# Patient Record
Sex: FEMALE | Race: WHITE | NOT HISPANIC OR LATINO | Employment: OTHER | URBAN - METROPOLITAN AREA
[De-identification: names, ages, dates, MRNs, and addresses within clinical notes are randomized per-mention and may not be internally consistent; named-entity substitution may affect disease eponyms.]

---

## 2020-01-01 ENCOUNTER — APPOINTMENT (OUTPATIENT)
Dept: RADIOLOGY | Facility: MEDICAL CENTER | Age: 85
DRG: 871 | End: 2020-01-01
Attending: INTERNAL MEDICINE
Payer: MEDICARE

## 2020-01-01 ENCOUNTER — APPOINTMENT (OUTPATIENT)
Dept: RADIOLOGY | Facility: MEDICAL CENTER | Age: 85
DRG: 871 | End: 2020-01-01
Attending: EMERGENCY MEDICINE
Payer: MEDICARE

## 2020-01-01 ENCOUNTER — APPOINTMENT (OUTPATIENT)
Dept: RADIOLOGY | Facility: MEDICAL CENTER | Age: 85
DRG: 871 | End: 2020-01-01
Attending: HOSPITALIST
Payer: MEDICARE

## 2020-01-01 ENCOUNTER — HOSPICE ADMISSION (OUTPATIENT)
Dept: HOSPICE | Facility: HOSPICE | Age: 85
End: 2020-01-01
Payer: MEDICARE

## 2020-01-01 ENCOUNTER — HOSPITAL ENCOUNTER (INPATIENT)
Facility: MEDICAL CENTER | Age: 85
LOS: 1 days | DRG: 871 | End: 2020-01-30
Attending: EMERGENCY MEDICINE | Admitting: INTERNAL MEDICINE
Payer: MEDICARE

## 2020-01-01 ENCOUNTER — HOME CARE VISIT (OUTPATIENT)
Dept: HOSPICE | Facility: HOSPICE | Age: 85
End: 2020-01-01
Payer: MEDICARE

## 2020-01-01 VITALS
SYSTOLIC BLOOD PRESSURE: 62 MMHG | RESPIRATION RATE: 14 BRPM | HEART RATE: 94 BPM | DIASTOLIC BLOOD PRESSURE: 33 MMHG | OXYGEN SATURATION: 69 % | BODY MASS INDEX: 21.14 KG/M2 | TEMPERATURE: 97.1 F | HEIGHT: 62 IN | WEIGHT: 114.86 LBS

## 2020-01-01 DIAGNOSIS — R65.21 SEPTIC SHOCK (HCC): ICD-10-CM

## 2020-01-01 DIAGNOSIS — G30.9 ALZHEIMER'S DEMENTIA WITHOUT BEHAVIORAL DISTURBANCE, UNSPECIFIED TIMING OF DEMENTIA ONSET: ICD-10-CM

## 2020-01-01 DIAGNOSIS — E87.20 LACTIC ACIDOSIS: ICD-10-CM

## 2020-01-01 DIAGNOSIS — F02.80 ALZHEIMER'S DEMENTIA WITHOUT BEHAVIORAL DISTURBANCE, UNSPECIFIED TIMING OF DEMENTIA ONSET: ICD-10-CM

## 2020-01-01 DIAGNOSIS — J96.01 ACUTE RESPIRATORY FAILURE WITH HYPOXIA (HCC): ICD-10-CM

## 2020-01-01 DIAGNOSIS — N17.0 ACUTE RENAL FAILURE WITH TUBULAR NECROSIS (HCC): ICD-10-CM

## 2020-01-01 DIAGNOSIS — A41.9 SEPTIC SHOCK (HCC): ICD-10-CM

## 2020-01-01 DIAGNOSIS — G93.40 ACUTE ENCEPHALOPATHY: ICD-10-CM

## 2020-01-01 LAB
ACTION RANGE TRIGGERED IACRT: NO
ACTION RANGE TRIGGERED IACRT: NO
ACTION RANGE TRIGGERED IACRT: YES
ALBUMIN SERPL BCP-MCNC: 2.4 G/DL (ref 3.2–4.9)
ALBUMIN SERPL BCP-MCNC: 3.2 G/DL (ref 3.2–4.9)
ALBUMIN/GLOB SERPL: 0.8 G/DL
ALBUMIN/GLOB SERPL: 0.9 G/DL
ALP SERPL-CCNC: 54 U/L (ref 30–99)
ALP SERPL-CCNC: 78 U/L (ref 30–99)
ALT SERPL-CCNC: 30 U/L (ref 2–50)
ALT SERPL-CCNC: 37 U/L (ref 2–50)
ANION GAP SERPL CALC-SCNC: 11 MMOL/L (ref 0–11.9)
ANION GAP SERPL CALC-SCNC: 14 MMOL/L (ref 0–11.9)
ANION GAP SERPL CALC-SCNC: 15 MMOL/L (ref 0–11.9)
ANION GAP SERPL CALC-SCNC: 18 MMOL/L (ref 0–11.9)
ANION GAP SERPL CALC-SCNC: 19 MMOL/L (ref 0–11.9)
ANION GAP SERPL CALC-SCNC: 20 MMOL/L (ref 0–11.9)
ANION GAP SERPL CALC-SCNC: NORMAL MMOL/L (ref 0–11.9)
APPEARANCE UR: ABNORMAL
AST SERPL-CCNC: 33 U/L (ref 12–45)
AST SERPL-CCNC: 45 U/L (ref 12–45)
BACTERIA #/AREA URNS HPF: ABNORMAL /HPF
BACTERIA SPEC RESP CULT: ABNORMAL
BACTERIA SPEC RESP CULT: ABNORMAL
BASE EXCESS BLDA CALC-SCNC: -1 MMOL/L (ref -4–3)
BASE EXCESS BLDA CALC-SCNC: -12 MMOL/L (ref -4–3)
BASE EXCESS BLDA CALC-SCNC: -2 MMOL/L (ref -4–3)
BASE EXCESS BLDA CALC-SCNC: -9 MMOL/L (ref -4–3)
BASO STIPL BLD QL SMEAR: NORMAL
BASOPHILS # BLD AUTO: 0 % (ref 0–1.8)
BASOPHILS # BLD AUTO: 0.8 % (ref 0–1.8)
BASOPHILS # BLD AUTO: 1 % (ref 0–1.8)
BASOPHILS # BLD: 0 K/UL (ref 0–0.12)
BASOPHILS # BLD: 0.18 K/UL (ref 0–0.12)
BASOPHILS # BLD: 0.21 K/UL (ref 0–0.12)
BILIRUB SERPL-MCNC: 0.3 MG/DL (ref 0.1–1.5)
BILIRUB SERPL-MCNC: 0.5 MG/DL (ref 0.1–1.5)
BILIRUB UR QL STRIP.AUTO: NEGATIVE
BODY TEMPERATURE: ABNORMAL CENTIGRADE
BODY TEMPERATURE: ABNORMAL DEGREES
BODY TEMPERATURE: ABNORMAL DEGREES
BODY TEMPERATURE: NORMAL DEGREES
BUN SERPL-MCNC: 32 MG/DL (ref 8–22)
BUN SERPL-MCNC: 48 MG/DL (ref 8–22)
BUN SERPL-MCNC: 53 MG/DL (ref 8–22)
BUN SERPL-MCNC: 56 MG/DL (ref 8–22)
BUN SERPL-MCNC: 71 MG/DL (ref 8–22)
BUN SERPL-MCNC: 74 MG/DL (ref 8–22)
BUN SERPL-MCNC: NORMAL MG/DL (ref 8–22)
CALCIUM SERPL-MCNC: 7.5 MG/DL (ref 8.4–10.2)
CALCIUM SERPL-MCNC: 7.7 MG/DL (ref 8.4–10.2)
CALCIUM SERPL-MCNC: 8 MG/DL (ref 8.4–10.2)
CALCIUM SERPL-MCNC: 8 MG/DL (ref 8.4–10.2)
CALCIUM SERPL-MCNC: 8.2 MG/DL (ref 8.4–10.2)
CALCIUM SERPL-MCNC: 8.2 MG/DL (ref 8.4–10.2)
CALCIUM SERPL-MCNC: NORMAL MG/DL (ref 8.4–10.2)
CHLORIDE SERPL-SCNC: 106 MMOL/L (ref 96–112)
CHLORIDE SERPL-SCNC: 106 MMOL/L (ref 96–112)
CHLORIDE SERPL-SCNC: 110 MMOL/L (ref 96–112)
CHLORIDE SERPL-SCNC: 114 MMOL/L (ref 96–112)
CHLORIDE SERPL-SCNC: 116 MMOL/L (ref 96–112)
CHLORIDE SERPL-SCNC: 116 MMOL/L (ref 96–112)
CHLORIDE SERPL-SCNC: NORMAL MMOL/L (ref 96–112)
CO2 BLDA-SCNC: 15 MMOL/L (ref 20–33)
CO2 BLDA-SCNC: 22 MMOL/L (ref 20–33)
CO2 BLDA-SCNC: 23 MMOL/L (ref 20–33)
CO2 SERPL-SCNC: 18 MMOL/L (ref 20–33)
CO2 SERPL-SCNC: 18 MMOL/L (ref 20–33)
CO2 SERPL-SCNC: 19 MMOL/L (ref 20–33)
CO2 SERPL-SCNC: 19 MMOL/L (ref 20–33)
CO2 SERPL-SCNC: 20 MMOL/L (ref 20–33)
CO2 SERPL-SCNC: 24 MMOL/L (ref 20–33)
CO2 SERPL-SCNC: NORMAL MMOL/L (ref 20–33)
COLOR UR: YELLOW
CREAT SERPL-MCNC: 0.92 MG/DL (ref 0.5–1.4)
CREAT SERPL-MCNC: 1.15 MG/DL (ref 0.5–1.4)
CREAT SERPL-MCNC: 1.3 MG/DL (ref 0.5–1.4)
CREAT SERPL-MCNC: 1.48 MG/DL (ref 0.5–1.4)
CREAT SERPL-MCNC: 1.95 MG/DL (ref 0.5–1.4)
CREAT SERPL-MCNC: 2.24 MG/DL (ref 0.5–1.4)
CREAT SERPL-MCNC: NORMAL MG/DL (ref 0.5–1.4)
EKG IMPRESSION: NORMAL
EOSINOPHIL # BLD AUTO: 0 K/UL (ref 0–0.51)
EOSINOPHIL # BLD AUTO: 0 K/UL (ref 0–0.51)
EOSINOPHIL # BLD AUTO: 0.01 K/UL (ref 0–0.51)
EOSINOPHIL NFR BLD: 0 % (ref 0–6.9)
ERYTHROCYTE [DISTWIDTH] IN BLOOD BY AUTOMATED COUNT: 37.2 FL (ref 35.9–50)
ERYTHROCYTE [DISTWIDTH] IN BLOOD BY AUTOMATED COUNT: 37.5 FL (ref 35.9–50)
ERYTHROCYTE [DISTWIDTH] IN BLOOD BY AUTOMATED COUNT: 38.7 FL (ref 35.9–50)
GLOBULIN SER CALC-MCNC: 3.1 G/DL (ref 1.9–3.5)
GLOBULIN SER CALC-MCNC: 3.4 G/DL (ref 1.9–3.5)
GLUCOSE BLD-MCNC: 105 MG/DL (ref 65–99)
GLUCOSE BLD-MCNC: 125 MG/DL (ref 65–99)
GLUCOSE BLD-MCNC: 129 MG/DL (ref 65–99)
GLUCOSE BLD-MCNC: 142 MG/DL (ref 65–99)
GLUCOSE BLD-MCNC: 164 MG/DL (ref 65–99)
GLUCOSE BLD-MCNC: 165 MG/DL (ref 65–99)
GLUCOSE BLD-MCNC: 171 MG/DL (ref 65–99)
GLUCOSE BLD-MCNC: 175 MG/DL (ref 65–99)
GLUCOSE BLD-MCNC: 175 MG/DL (ref 65–99)
GLUCOSE BLD-MCNC: 181 MG/DL (ref 65–99)
GLUCOSE BLD-MCNC: 183 MG/DL (ref 65–99)
GLUCOSE BLD-MCNC: 188 MG/DL (ref 65–99)
GLUCOSE BLD-MCNC: 192 MG/DL (ref 65–99)
GLUCOSE BLD-MCNC: 194 MG/DL (ref 65–99)
GLUCOSE BLD-MCNC: 197 MG/DL (ref 65–99)
GLUCOSE BLD-MCNC: 223 MG/DL (ref 65–99)
GLUCOSE BLD-MCNC: 230 MG/DL (ref 65–99)
GLUCOSE BLD-MCNC: 242 MG/DL (ref 65–99)
GLUCOSE BLD-MCNC: 250 MG/DL (ref 65–99)
GLUCOSE BLD-MCNC: 280 MG/DL (ref 65–99)
GLUCOSE BLD-MCNC: 330 MG/DL (ref 65–99)
GLUCOSE SERPL-MCNC: 150 MG/DL (ref 65–99)
GLUCOSE SERPL-MCNC: 212 MG/DL (ref 65–99)
GLUCOSE SERPL-MCNC: 225 MG/DL (ref 65–99)
GLUCOSE SERPL-MCNC: 336 MG/DL (ref 65–99)
GLUCOSE SERPL-MCNC: 636 MG/DL (ref 65–99)
GLUCOSE SERPL-MCNC: 878 MG/DL (ref 65–99)
GLUCOSE SERPL-MCNC: 893 MG/DL (ref 65–99)
GLUCOSE SERPL-MCNC: 906 MG/DL (ref 65–99)
GLUCOSE SERPL-MCNC: NORMAL MG/DL (ref 65–99)
GLUCOSE UR STRIP.AUTO-MCNC: 500 MG/DL
GRAM STN SPEC: ABNORMAL
GRAM STN SPEC: NORMAL
HCO3 BLDA-SCNC: 14.1 MMOL/L (ref 17–25)
HCO3 BLDA-SCNC: 18 MMOL/L (ref 17–25)
HCO3 BLDA-SCNC: 21.4 MMOL/L (ref 17–25)
HCO3 BLDA-SCNC: 22.1 MMOL/L (ref 17–25)
HCT VFR BLD AUTO: 24.7 % (ref 37–47)
HCT VFR BLD AUTO: 26.1 % (ref 37–47)
HCT VFR BLD AUTO: 33.9 % (ref 37–47)
HGB BLD-MCNC: 10.3 G/DL (ref 12–16)
HGB BLD-MCNC: 7.6 G/DL (ref 12–16)
HGB BLD-MCNC: 8 G/DL (ref 12–16)
HOROWITZ INDEX BLDA+IHG-RTO: 122 MM[HG]
HOROWITZ INDEX BLDA+IHG-RTO: 145 MM[HG]
HOROWITZ INDEX BLDA+IHG-RTO: 210 MM[HG]
HYPOCHROMIA BLD QL SMEAR: ABNORMAL
HYPOCHROMIA BLD QL SMEAR: ABNORMAL
IMM GRANULOCYTES # BLD AUTO: 0.15 K/UL (ref 0–0.11)
IMM GRANULOCYTES NFR BLD AUTO: 0.7 % (ref 0–0.9)
INST. QUALIFIED PATIENT IIQPT: YES
KETONES UR STRIP.AUTO-MCNC: ABNORMAL MG/DL
LACTATE BLD-SCNC: 1.9 MMOL/L (ref 0.5–2)
LACTATE BLD-SCNC: 10.8 MMOL/L (ref 0.5–2)
LACTATE BLD-SCNC: 4.2 MMOL/L (ref 0.5–2)
LACTATE BLD-SCNC: 4.4 MMOL/L (ref 0.5–2)
LACTATE BLD-SCNC: 4.6 MMOL/L (ref 0.5–2)
LACTATE BLD-SCNC: 5.1 MMOL/L (ref 0.5–2)
LACTATE BLD-SCNC: 6.5 MMOL/L (ref 0.5–2)
LACTATE BLD-SCNC: NORMAL MMOL/L (ref 0.5–2)
LEUKOCYTE ESTERASE UR QL STRIP.AUTO: ABNORMAL
LYMPHOCYTES # BLD AUTO: 1.29 K/UL (ref 1–4.8)
LYMPHOCYTES # BLD AUTO: 1.86 K/UL (ref 1–4.8)
LYMPHOCYTES # BLD AUTO: 3.15 K/UL (ref 1–4.8)
LYMPHOCYTES NFR BLD: 15 % (ref 22–41)
LYMPHOCYTES NFR BLD: 5 % (ref 22–41)
LYMPHOCYTES NFR BLD: 8.4 % (ref 22–41)
MAGNESIUM SERPL-MCNC: 1.8 MG/DL (ref 1.5–2.5)
MAGNESIUM SERPL-MCNC: 2.2 MG/DL (ref 1.5–2.5)
MANUAL DIFF BLD: ABNORMAL
MANUAL DIFF BLD: ABNORMAL
MCH RBC QN AUTO: 20.2 PG (ref 27–33)
MCH RBC QN AUTO: 20.2 PG (ref 27–33)
MCH RBC QN AUTO: 20.3 PG (ref 27–33)
MCHC RBC AUTO-ENTMCNC: 30.4 G/DL (ref 33.6–35)
MCHC RBC AUTO-ENTMCNC: 30.7 G/DL (ref 33.6–35)
MCHC RBC AUTO-ENTMCNC: 30.8 G/DL (ref 33.6–35)
MCV RBC AUTO: 65.7 FL (ref 81.4–97.8)
MCV RBC AUTO: 65.9 FL (ref 81.4–97.8)
MCV RBC AUTO: 66.3 FL (ref 81.4–97.8)
METAMYELOCYTES NFR BLD MANUAL: 1 %
METAMYELOCYTES NFR BLD MANUAL: 8 %
MICRO URNS: ABNORMAL
MICROCYTES BLD QL SMEAR: ABNORMAL
MICROCYTES BLD QL SMEAR: ABNORMAL
MONOCYTES # BLD AUTO: 1.03 K/UL (ref 0–0.85)
MONOCYTES # BLD AUTO: 1.44 K/UL (ref 0–0.85)
MONOCYTES # BLD AUTO: 1.68 K/UL (ref 0–0.85)
MONOCYTES NFR BLD AUTO: 4 % (ref 0–13.4)
MONOCYTES NFR BLD AUTO: 6.5 % (ref 0–13.4)
MONOCYTES NFR BLD AUTO: 8 % (ref 0–13.4)
MRSA DNA SPEC QL NAA+PROBE: NORMAL
NEUTROPHILS # BLD AUTO: 14.28 K/UL (ref 2–7.15)
NEUTROPHILS # BLD AUTO: 18.63 K/UL (ref 2–7.15)
NEUTROPHILS # BLD AUTO: 23.22 K/UL (ref 2–7.15)
NEUTROPHILS NFR BLD: 20 % (ref 44–72)
NEUTROPHILS NFR BLD: 57 % (ref 44–72)
NEUTROPHILS NFR BLD: 83.6 % (ref 44–72)
NEUTS BAND NFR BLD MANUAL: 33 % (ref 0–10)
NEUTS BAND NFR BLD MANUAL: 48 % (ref 0–10)
NITRITE UR QL STRIP.AUTO: NEGATIVE
NRBC # BLD AUTO: 0.03 K/UL
NRBC # BLD AUTO: 0.04 K/UL
NRBC # BLD AUTO: 0.05 K/UL
NRBC BLD-RTO: 0.1 /100 WBC
NRBC BLD-RTO: 0.2 /100 WBC
NRBC BLD-RTO: 0.2 /100 WBC
NT-PROBNP SERPL IA-MCNC: 1199 PG/ML (ref 0–125)
O2/TOTAL GAS SETTING VFR VENT: 40 %
O2/TOTAL GAS SETTING VFR VENT: 60 %
O2/TOTAL GAS SETTING VFR VENT: 60 %
OSMOLALITY SERPL: 327 MOSM/KG H2O (ref 278–298)
OSMOLALITY SERPL: 341 MOSM/KG H2O (ref 278–298)
PCO2 BLDA: 24.8 MMHG (ref 26–37)
PCO2 BLDA: 30.4 MMHG (ref 26–37)
PCO2 BLDA: 34.3 MMHG (ref 26–37)
PCO2 BLDA: 41.9 MMHG (ref 26–37)
PH BLDA: 7.24 [PH] (ref 7.4–7.5)
PH BLDA: 7.27 [PH] (ref 7.4–7.5)
PH BLDA: 7.42 [PH] (ref 7.4–7.5)
PH BLDA: 7.54 [PH] (ref 7.4–7.5)
PH TEMP ADJ BLDA: 7.26 [PH] (ref 7.4–7.5)
PH TEMP ADJ BLDA: 7.42 [PH] (ref 7.4–7.5)
PH TEMP ADJ BLDA: 7.54 [PH] (ref 7.4–7.5)
PH UR STRIP.AUTO: 5 [PH] (ref 5–8)
PHOSPHATE SERPL-MCNC: 0.8 MG/DL (ref 2.5–4.5)
PHOSPHATE SERPL-MCNC: 3.5 MG/DL (ref 2.5–4.5)
PLATELET # BLD AUTO: 238 K/UL (ref 164–446)
PLATELET # BLD AUTO: 255 K/UL (ref 164–446)
PLATELET # BLD AUTO: 447 K/UL (ref 164–446)
PLATELET BLD QL SMEAR: NORMAL
PLATELET BLD QL SMEAR: NORMAL
PMV BLD AUTO: 12.2 FL (ref 9–12.9)
PMV BLD AUTO: 13 FL (ref 9–12.9)
PO2 BLDA: 73 MMHG (ref 64–87)
PO2 BLDA: 84 MMHG (ref 64–87)
PO2 BLDA: 87 MMHG (ref 64–87)
PO2 BLDA: 87.9 MMHG (ref 64–87)
PO2 TEMP ADJ BLDA: 76 MMHG (ref 64–87)
PO2 TEMP ADJ BLDA: 81 MMHG (ref 64–87)
PO2 TEMP ADJ BLDA: 88 MMHG (ref 64–87)
POLYCHROMASIA BLD QL SMEAR: NORMAL
POLYCHROMASIA BLD QL SMEAR: NORMAL
POTASSIUM SERPL-SCNC: 3.6 MMOL/L (ref 3.6–5.5)
POTASSIUM SERPL-SCNC: 3.7 MMOL/L (ref 3.6–5.5)
POTASSIUM SERPL-SCNC: 4 MMOL/L (ref 3.6–5.5)
POTASSIUM SERPL-SCNC: 4.1 MMOL/L (ref 3.6–5.5)
POTASSIUM SERPL-SCNC: 5.7 MMOL/L (ref 3.6–5.5)
POTASSIUM SERPL-SCNC: 5.8 MMOL/L (ref 3.6–5.5)
POTASSIUM SERPL-SCNC: NORMAL MMOL/L (ref 3.6–5.5)
PROCALCITONIN SERPL-MCNC: 25.5 NG/ML
PROT SERPL-MCNC: 5.5 G/DL (ref 6–8.2)
PROT SERPL-MCNC: 6.6 G/DL (ref 6–8.2)
PROT UR QL STRIP: NEGATIVE MG/DL
RBC # BLD AUTO: 3.76 M/UL (ref 4.2–5.4)
RBC # BLD AUTO: 3.9 M/UL (ref 4.2–5.4)
RBC # BLD AUTO: 5.11 M/UL (ref 4.2–5.4)
RBC # URNS HPF: ABNORMAL /HPF
RBC BLD AUTO: PRESENT
RBC BLD AUTO: PRESENT
RBC UR QL AUTO: ABNORMAL
SAO2 % BLDA: 92 % (ref 93–99)
SAO2 % BLDA: 93.3 % (ref 93–99)
SAO2 % BLDA: 97 % (ref 93–99)
SAO2 % BLDA: 98 % (ref 93–99)
SIGNIFICANT IND 70042: ABNORMAL
SIGNIFICANT IND 70042: NORMAL
SIGNIFICANT IND 70042: NORMAL
SITE SITE: ABNORMAL
SITE SITE: NORMAL
SITE SITE: NORMAL
SODIUM SERPL-SCNC: 144 MMOL/L (ref 135–145)
SODIUM SERPL-SCNC: 145 MMOL/L (ref 135–145)
SODIUM SERPL-SCNC: 145 MMOL/L (ref 135–145)
SODIUM SERPL-SCNC: 147 MMOL/L (ref 135–145)
SODIUM SERPL-SCNC: 150 MMOL/L (ref 135–145)
SODIUM SERPL-SCNC: 152 MMOL/L (ref 135–145)
SODIUM SERPL-SCNC: NORMAL MMOL/L (ref 135–145)
SOURCE SOURCE: ABNORMAL
SOURCE SOURCE: NORMAL
SOURCE SOURCE: NORMAL
SP GR UR REFRACTOMETRY: 1.02
SPECIMEN DRAWN FROM PATIENT: ABNORMAL
SPECIMEN DRAWN FROM PATIENT: ABNORMAL
SPECIMEN DRAWN FROM PATIENT: NORMAL
TROPONIN T SERPL-MCNC: 86 NG/L (ref 6–19)
VANCOMYCIN TIMED 2584: 7.4 UG/ML
WBC # BLD AUTO: 21 K/UL (ref 4.8–10.8)
WBC # BLD AUTO: 22.3 K/UL (ref 4.8–10.8)
WBC # BLD AUTO: 25.8 K/UL (ref 4.8–10.8)
WBC #/AREA URNS HPF: ABNORMAL /HPF

## 2020-01-01 PROCEDURE — 82962 GLUCOSE BLOOD TEST: CPT | Mod: 91

## 2020-01-01 PROCEDURE — 02HV33Z INSERTION OF INFUSION DEVICE INTO SUPERIOR VENA CAVA, PERCUTANEOUS APPROACH: ICD-10-PCS | Performed by: INTERNAL MEDICINE

## 2020-01-01 PROCEDURE — 700111 HCHG RX REV CODE 636 W/ 250 OVERRIDE (IP): Performed by: INTERNAL MEDICINE

## 2020-01-01 PROCEDURE — 99291 CRITICAL CARE FIRST HOUR: CPT

## 2020-01-01 PROCEDURE — 80048 BASIC METABOLIC PNL TOTAL CA: CPT

## 2020-01-01 PROCEDURE — 36592 COLLECT BLOOD FROM PICC: CPT

## 2020-01-01 PROCEDURE — 700102 HCHG RX REV CODE 250 W/ 637 OVERRIDE(OP)

## 2020-01-01 PROCEDURE — 700105 HCHG RX REV CODE 258: Performed by: INTERNAL MEDICINE

## 2020-01-01 PROCEDURE — 96368 THER/DIAG CONCURRENT INF: CPT

## 2020-01-01 PROCEDURE — 36556 INSERT NON-TUNNEL CV CATH: CPT | Mod: RT | Performed by: INTERNAL MEDICINE

## 2020-01-01 PROCEDURE — 85027 COMPLETE CBC AUTOMATED: CPT

## 2020-01-01 PROCEDURE — 82803 BLOOD GASES ANY COMBINATION: CPT | Mod: 91

## 2020-01-01 PROCEDURE — 700101 HCHG RX REV CODE 250

## 2020-01-01 PROCEDURE — 700111 HCHG RX REV CODE 636 W/ 250 OVERRIDE (IP)

## 2020-01-01 PROCEDURE — 94760 N-INVAS EAR/PLS OXIMETRY 1: CPT

## 2020-01-01 PROCEDURE — 84100 ASSAY OF PHOSPHORUS: CPT

## 2020-01-01 PROCEDURE — 87147 CULTURE TYPE IMMUNOLOGIC: CPT

## 2020-01-01 PROCEDURE — 96366 THER/PROPH/DIAG IV INF ADDON: CPT

## 2020-01-01 PROCEDURE — G0378 HOSPITAL OBSERVATION PER HR: HCPCS

## 2020-01-01 PROCEDURE — 31500 INSERT EMERGENCY AIRWAY: CPT

## 2020-01-01 PROCEDURE — 700105 HCHG RX REV CODE 258: Performed by: EMERGENCY MEDICINE

## 2020-01-01 PROCEDURE — 700101 HCHG RX REV CODE 250: Performed by: INTERNAL MEDICINE

## 2020-01-01 PROCEDURE — 71045 X-RAY EXAM CHEST 1 VIEW: CPT

## 2020-01-01 PROCEDURE — 36600 WITHDRAWAL OF ARTERIAL BLOOD: CPT

## 2020-01-01 PROCEDURE — 770022 HCHG ROOM/CARE - ICU (200)

## 2020-01-01 PROCEDURE — 99291 CRITICAL CARE FIRST HOUR: CPT | Performed by: INTERNAL MEDICINE

## 2020-01-01 PROCEDURE — 700102 HCHG RX REV CODE 250 W/ 637 OVERRIDE(OP): Performed by: INTERNAL MEDICINE

## 2020-01-01 PROCEDURE — 96372 THER/PROPH/DIAG INJ SC/IM: CPT

## 2020-01-01 PROCEDURE — E0191 PROTECTOR HEEL OR ELBOW: HCPCS | Performed by: INTERNAL MEDICINE

## 2020-01-01 PROCEDURE — 96365 THER/PROPH/DIAG IV INF INIT: CPT

## 2020-01-01 PROCEDURE — 85007 BL SMEAR W/DIFF WBC COUNT: CPT

## 2020-01-01 PROCEDURE — 82803 BLOOD GASES ANY COMBINATION: CPT

## 2020-01-01 PROCEDURE — 87040 BLOOD CULTURE FOR BACTERIA: CPT

## 2020-01-01 PROCEDURE — 700111 HCHG RX REV CODE 636 W/ 250 OVERRIDE (IP): Performed by: EMERGENCY MEDICINE

## 2020-01-01 PROCEDURE — 80053 COMPREHEN METABOLIC PANEL: CPT

## 2020-01-01 PROCEDURE — 82947 ASSAY GLUCOSE BLOOD QUANT: CPT

## 2020-01-01 PROCEDURE — 93005 ELECTROCARDIOGRAM TRACING: CPT | Performed by: EMERGENCY MEDICINE

## 2020-01-01 PROCEDURE — 99292 CRITICAL CARE ADDL 30 MIN: CPT | Mod: 25 | Performed by: INTERNAL MEDICINE

## 2020-01-01 PROCEDURE — 87205 SMEAR GRAM STAIN: CPT

## 2020-01-01 PROCEDURE — 83930 ASSAY OF BLOOD OSMOLALITY: CPT

## 2020-01-01 PROCEDURE — 80202 ASSAY OF VANCOMYCIN: CPT

## 2020-01-01 PROCEDURE — 96376 TX/PRO/DX INJ SAME DRUG ADON: CPT

## 2020-01-01 PROCEDURE — 99291 CRITICAL CARE FIRST HOUR: CPT | Mod: 25 | Performed by: INTERNAL MEDICINE

## 2020-01-01 PROCEDURE — 87641 MR-STAPH DNA AMP PROBE: CPT

## 2020-01-01 PROCEDURE — 36556 INSERT NON-TUNNEL CV CATH: CPT

## 2020-01-01 PROCEDURE — 84484 ASSAY OF TROPONIN QUANT: CPT

## 2020-01-01 PROCEDURE — 82962 GLUCOSE BLOOD TEST: CPT

## 2020-01-01 PROCEDURE — 85025 COMPLETE CBC W/AUTO DIFF WBC: CPT

## 2020-01-01 PROCEDURE — 94003 VENT MGMT INPAT SUBQ DAY: CPT

## 2020-01-01 PROCEDURE — 83605 ASSAY OF LACTIC ACID: CPT | Mod: 91

## 2020-01-01 PROCEDURE — A9270 NON-COVERED ITEM OR SERVICE: HCPCS | Performed by: INTERNAL MEDICINE

## 2020-01-01 PROCEDURE — 87077 CULTURE AEROBIC IDENTIFY: CPT

## 2020-01-01 PROCEDURE — 84145 PROCALCITONIN (PCT): CPT

## 2020-01-01 PROCEDURE — 700105 HCHG RX REV CODE 258: Performed by: HOSPITALIST

## 2020-01-01 PROCEDURE — 81001 URINALYSIS AUTO W/SCOPE: CPT

## 2020-01-01 PROCEDURE — 700101 HCHG RX REV CODE 250: Performed by: EMERGENCY MEDICINE

## 2020-01-01 PROCEDURE — 96375 TX/PRO/DX INJ NEW DRUG ADDON: CPT

## 2020-01-01 PROCEDURE — 83880 ASSAY OF NATRIURETIC PEPTIDE: CPT

## 2020-01-01 PROCEDURE — 94002 VENT MGMT INPAT INIT DAY: CPT

## 2020-01-01 PROCEDURE — 87186 SC STD MICRODIL/AGAR DIL: CPT

## 2020-01-01 PROCEDURE — 83735 ASSAY OF MAGNESIUM: CPT

## 2020-01-01 PROCEDURE — 94770 HCHG CO2 EXPIRED GAS DETERMINATION: CPT

## 2020-01-01 PROCEDURE — 87070 CULTURE OTHR SPECIMN AEROBIC: CPT

## 2020-01-01 PROCEDURE — B548ZZA ULTRASONOGRAPHY OF SUPERIOR VENA CAVA, GUIDANCE: ICD-10-PCS | Performed by: INTERNAL MEDICINE

## 2020-01-01 RX ORDER — QUETIAPINE FUMARATE 25 MG/1
25 TABLET, FILM COATED ORAL 2 TIMES DAILY
Status: DISCONTINUED | OUTPATIENT
Start: 2020-01-01 | End: 2020-01-01

## 2020-01-01 RX ORDER — AZITHROMYCIN 250 MG/1
500 TABLET, FILM COATED ORAL DAILY
Status: DISCONTINUED | OUTPATIENT
Start: 2020-01-01 | End: 2020-01-01

## 2020-01-01 RX ORDER — POLYETHYLENE GLYCOL 3350 17 G/17G
1 POWDER, FOR SOLUTION ORAL
Status: DISCONTINUED | OUTPATIENT
Start: 2020-01-01 | End: 2020-01-01

## 2020-01-01 RX ORDER — MIDAZOLAM HYDROCHLORIDE 1 MG/ML
1-5 INJECTION INTRAMUSCULAR; INTRAVENOUS
Status: DISCONTINUED | OUTPATIENT
Start: 2020-01-01 | End: 2020-01-01

## 2020-01-01 RX ORDER — CLONAZEPAM 0.5 MG/1
0.5 TABLET ORAL 2 TIMES DAILY
Status: SHIPPED | COMMUNITY
End: 2020-01-01

## 2020-01-01 RX ORDER — BISACODYL 10 MG
10 SUPPOSITORY, RECTAL RECTAL
Status: DISCONTINUED | OUTPATIENT
Start: 2020-01-01 | End: 2020-01-01

## 2020-01-01 RX ORDER — FAMOTIDINE 20 MG/1
20 TABLET, FILM COATED ORAL EVERY 12 HOURS
Status: DISCONTINUED | OUTPATIENT
Start: 2020-01-01 | End: 2020-01-01

## 2020-01-01 RX ORDER — SODIUM CHLORIDE, SODIUM LACTATE, POTASSIUM CHLORIDE, CALCIUM CHLORIDE 600; 310; 30; 20 MG/100ML; MG/100ML; MG/100ML; MG/100ML
1000 INJECTION, SOLUTION INTRAVENOUS ONCE
Status: COMPLETED | OUTPATIENT
Start: 2020-01-01 | End: 2020-01-01

## 2020-01-01 RX ORDER — ACETAMINOPHEN 325 MG/1
650 TABLET ORAL EVERY 6 HOURS PRN
Status: DISCONTINUED | OUTPATIENT
Start: 2020-01-01 | End: 2020-01-01 | Stop reason: HOSPADM

## 2020-01-01 RX ORDER — FAMOTIDINE 20 MG/1
20 TABLET, FILM COATED ORAL DAILY
Status: DISCONTINUED | OUTPATIENT
Start: 2020-01-01 | End: 2020-01-01

## 2020-01-01 RX ORDER — SODIUM CHLORIDE 9 MG/ML
INJECTION, SOLUTION INTRAVENOUS CONTINUOUS
Status: DISCONTINUED | OUTPATIENT
Start: 2020-01-01 | End: 2020-01-01

## 2020-01-01 RX ORDER — LEVOTHYROXINE SODIUM 112 UG/1
112 TABLET ORAL
Status: DISCONTINUED | OUTPATIENT
Start: 2020-01-01 | End: 2020-01-01

## 2020-01-01 RX ORDER — SODIUM CHLORIDE, SODIUM LACTATE, POTASSIUM CHLORIDE, CALCIUM CHLORIDE 600; 310; 30; 20 MG/100ML; MG/100ML; MG/100ML; MG/100ML
INJECTION, SOLUTION INTRAVENOUS CONTINUOUS
Status: DISCONTINUED | OUTPATIENT
Start: 2020-01-01 | End: 2020-01-01

## 2020-01-01 RX ORDER — AMOXICILLIN 250 MG
2 CAPSULE ORAL 2 TIMES DAILY
Status: DISCONTINUED | OUTPATIENT
Start: 2020-01-01 | End: 2020-01-01

## 2020-01-01 RX ORDER — LEVOTHYROXINE SODIUM 112 UG/1
112 TABLET ORAL
COMMUNITY

## 2020-01-01 RX ORDER — CHOLECALCIFEROL (VITAMIN D3) 125 MCG
500 CAPSULE ORAL DAILY
COMMUNITY

## 2020-01-01 RX ORDER — SODIUM CHLORIDE, SODIUM LACTATE, POTASSIUM CHLORIDE, CALCIUM CHLORIDE 600; 310; 30; 20 MG/100ML; MG/100ML; MG/100ML; MG/100ML
2000 INJECTION, SOLUTION INTRAVENOUS ONCE
Status: COMPLETED | OUTPATIENT
Start: 2020-01-01 | End: 2020-01-01

## 2020-01-01 RX ORDER — POTASSIUM CHLORIDE 14.9 MG/ML
20 INJECTION INTRAVENOUS ONCE
Status: COMPLETED | OUTPATIENT
Start: 2020-01-01 | End: 2020-01-01

## 2020-01-01 RX ORDER — MAGNESIUM SULFATE HEPTAHYDRATE 40 MG/ML
2 INJECTION, SOLUTION INTRAVENOUS ONCE
Status: COMPLETED | OUTPATIENT
Start: 2020-01-01 | End: 2020-01-01

## 2020-01-01 RX ORDER — ATROPINE SULFATE 10 MG/ML
2 SOLUTION/ DROPS OPHTHALMIC EVERY 4 HOURS PRN
Status: DISCONTINUED | OUTPATIENT
Start: 2020-01-01 | End: 2020-01-01 | Stop reason: HOSPADM

## 2020-01-01 RX ORDER — LORAZEPAM 2 MG/ML
1-2 INJECTION INTRAMUSCULAR
Status: DISCONTINUED | OUTPATIENT
Start: 2020-01-01 | End: 2020-01-01 | Stop reason: HOSPADM

## 2020-01-01 RX ORDER — HALOPERIDOL 5 MG/ML
5 INJECTION INTRAMUSCULAR EVERY 4 HOURS PRN
Status: DISCONTINUED | OUTPATIENT
Start: 2020-01-01 | End: 2020-01-01 | Stop reason: HOSPADM

## 2020-01-01 RX ORDER — HYDROCODONE BITARTRATE AND ACETAMINOPHEN 5; 325 MG/1; MG/1
1 TABLET ORAL
COMMUNITY

## 2020-01-01 RX ORDER — METRONIDAZOLE 500 MG/1
500 TABLET ORAL EVERY 8 HOURS
Status: DISCONTINUED | OUTPATIENT
Start: 2020-01-01 | End: 2020-01-01

## 2020-01-01 RX ORDER — ACETAMINOPHEN 650 MG/1
650 SUPPOSITORY RECTAL EVERY 4 HOURS PRN
Status: DISCONTINUED | OUTPATIENT
Start: 2020-01-01 | End: 2020-01-01 | Stop reason: HOSPADM

## 2020-01-01 RX ORDER — VASOPRESSIN 20 U/ML
INJECTION PARENTERAL
Status: COMPLETED
Start: 2020-01-01 | End: 2020-01-01

## 2020-01-01 RX ORDER — POLYETHYLENE GLYCOL 3350 17 G/17G
1 POWDER, FOR SOLUTION ORAL
Status: DISCONTINUED | OUTPATIENT
Start: 2020-01-01 | End: 2020-01-01 | Stop reason: HOSPADM

## 2020-01-01 RX ORDER — LINEZOLID 600 MG/1
600 TABLET, FILM COATED ORAL EVERY 12 HOURS
Status: DISCONTINUED | OUTPATIENT
Start: 2020-01-01 | End: 2020-01-01

## 2020-01-01 RX ORDER — QUETIAPINE FUMARATE 100 MG/1
100 TABLET, FILM COATED ORAL DAILY
Status: SHIPPED | COMMUNITY
Start: 2020-01-01 | End: 2020-01-01

## 2020-01-01 RX ORDER — CEFAZOLIN SODIUM 1 G/3ML
INJECTION, POWDER, FOR SOLUTION INTRAMUSCULAR; INTRAVENOUS
Status: COMPLETED
Start: 2020-01-01 | End: 2020-01-01

## 2020-01-01 RX ORDER — KETAMINE HYDROCHLORIDE 50 MG/ML
INJECTION, SOLUTION INTRAMUSCULAR; INTRAVENOUS
Status: COMPLETED
Start: 2020-01-01 | End: 2020-01-01

## 2020-01-01 RX ORDER — IPRATROPIUM BROMIDE AND ALBUTEROL SULFATE 2.5; .5 MG/3ML; MG/3ML
3 SOLUTION RESPIRATORY (INHALATION)
Status: DISCONTINUED | OUTPATIENT
Start: 2020-01-01 | End: 2020-01-01 | Stop reason: HOSPADM

## 2020-01-01 RX ORDER — SODIUM CHLORIDE, SODIUM LACTATE, POTASSIUM CHLORIDE, AND CALCIUM CHLORIDE .6; .31; .03; .02 G/100ML; G/100ML; G/100ML; G/100ML
500 INJECTION, SOLUTION INTRAVENOUS
Status: DISCONTINUED | OUTPATIENT
Start: 2020-01-01 | End: 2020-01-01 | Stop reason: HOSPADM

## 2020-01-01 RX ORDER — SODIUM CHLORIDE 9 MG/ML
1000 INJECTION, SOLUTION INTRAVENOUS ONCE
Status: COMPLETED | OUTPATIENT
Start: 2020-01-01 | End: 2020-01-01

## 2020-01-01 RX ORDER — DIVALPROEX SODIUM 125 MG/1
125 CAPSULE, COATED PELLETS ORAL 3 TIMES DAILY
COMMUNITY

## 2020-01-01 RX ORDER — BISACODYL 10 MG
10 SUPPOSITORY, RECTAL RECTAL
Status: DISCONTINUED | OUTPATIENT
Start: 2020-01-01 | End: 2020-01-01 | Stop reason: HOSPADM

## 2020-01-01 RX ORDER — DIVALPROEX SODIUM 125 MG/1
125 CAPSULE, COATED PELLETS ORAL 3 TIMES DAILY
Status: DISCONTINUED | OUTPATIENT
Start: 2020-01-01 | End: 2020-01-01

## 2020-01-01 RX ORDER — SODIUM CHLORIDE 450 MG/100ML
INJECTION, SOLUTION INTRAVENOUS CONTINUOUS
Status: DISCONTINUED | OUTPATIENT
Start: 2020-01-01 | End: 2020-01-01

## 2020-01-01 RX ORDER — SODIUM CHLORIDE, SODIUM LACTATE, POTASSIUM CHLORIDE, AND CALCIUM CHLORIDE .6; .31; .03; .02 G/100ML; G/100ML; G/100ML; G/100ML
30 INJECTION, SOLUTION INTRAVENOUS
Status: DISCONTINUED | OUTPATIENT
Start: 2020-01-01 | End: 2020-01-01 | Stop reason: HOSPADM

## 2020-01-01 RX ORDER — INSULIN GLARGINE 100 [IU]/ML
20 INJECTION, SOLUTION SUBCUTANEOUS
Status: DISCONTINUED | OUTPATIENT
Start: 2020-01-01 | End: 2020-01-01

## 2020-01-01 RX ADMIN — POLYETHYLENE GLYCOL (3350) 1 PACKET: 17 POWDER, FOR SOLUTION ORAL at 05:41

## 2020-01-01 RX ADMIN — ENOXAPARIN SODIUM 40 MG: 100 INJECTION SUBCUTANEOUS at 17:47

## 2020-01-01 RX ADMIN — SODIUM CHLORIDE, POTASSIUM CHLORIDE, SODIUM LACTATE AND CALCIUM CHLORIDE 1000 ML: 600; 310; 30; 20 INJECTION, SOLUTION INTRAVENOUS at 14:45

## 2020-01-01 RX ADMIN — DIVALPROEX SODIUM 125 MG: 125 CAPSULE, COATED PELLETS ORAL at 17:27

## 2020-01-01 RX ADMIN — HYDROCORTISONE SODIUM SUCCINATE 50 MG: 100 INJECTION, POWDER, FOR SOLUTION INTRAMUSCULAR; INTRAVENOUS at 05:58

## 2020-01-01 RX ADMIN — AZITHROMYCIN MONOHYDRATE 500 MG: 500 INJECTION, POWDER, LYOPHILIZED, FOR SOLUTION INTRAVENOUS at 07:08

## 2020-01-01 RX ADMIN — Medication 150 MCG/HR: at 03:45

## 2020-01-01 RX ADMIN — SODIUM CHLORIDE 9 UNITS/HR: 9 INJECTION, SOLUTION INTRAVENOUS at 15:04

## 2020-01-01 RX ADMIN — FENTANYL CITRATE 100 MCG: 50 INJECTION INTRAMUSCULAR; INTRAVENOUS at 20:33

## 2020-01-01 RX ADMIN — FENTANYL CITRATE 50 MCG: 50 INJECTION INTRAMUSCULAR; INTRAVENOUS at 13:40

## 2020-01-01 RX ADMIN — INSULIN HUMAN 2 UNITS: 100 INJECTION, SOLUTION PARENTERAL at 06:00

## 2020-01-01 RX ADMIN — Medication 50 MCG/HR: at 17:41

## 2020-01-01 RX ADMIN — LORAZEPAM 1 MG: 2 INJECTION INTRAMUSCULAR; INTRAVENOUS at 10:24

## 2020-01-01 RX ADMIN — METRONIDAZOLE 500 MG: 500 INJECTION, SOLUTION INTRAVENOUS at 21:58

## 2020-01-01 RX ADMIN — CEFTRIAXONE SODIUM 2 G: 2 INJECTION, POWDER, FOR SOLUTION INTRAMUSCULAR; INTRAVENOUS at 13:28

## 2020-01-01 RX ADMIN — METRONIDAZOLE 500 MG: 500 INJECTION, SOLUTION INTRAVENOUS at 06:06

## 2020-01-01 RX ADMIN — NOREPINEPHRINE BITARTRATE 5 MCG/MIN: 1 INJECTION INTRAVENOUS at 13:32

## 2020-01-01 RX ADMIN — HYDROCORTISONE SODIUM SUCCINATE 50 MG: 100 INJECTION, POWDER, FOR SOLUTION INTRAMUSCULAR; INTRAVENOUS at 17:47

## 2020-01-01 RX ADMIN — ENOXAPARIN SODIUM 40 MG: 100 INJECTION SUBCUTANEOUS at 05:58

## 2020-01-01 RX ADMIN — INSULIN HUMAN 9 UNITS: 100 INJECTION, SOLUTION PARENTERAL at 15:03

## 2020-01-01 RX ADMIN — DIVALPROEX SODIUM 125 MG: 125 CAPSULE, COATED PELLETS ORAL at 11:51

## 2020-01-01 RX ADMIN — INSULIN HUMAN 2 UNITS: 100 INJECTION, SOLUTION PARENTERAL at 12:01

## 2020-01-01 RX ADMIN — MAGNESIUM SULFATE 2 G: 2 INJECTION INTRAVENOUS at 07:50

## 2020-01-01 RX ADMIN — QUETIAPINE FUMARATE 25 MG: 25 TABLET ORAL at 07:50

## 2020-01-01 RX ADMIN — Medication 200 MCG/HR: at 09:53

## 2020-01-01 RX ADMIN — LINEZOLID 600 MG: 600 TABLET, FILM COATED ORAL at 05:41

## 2020-01-01 RX ADMIN — FENTANYL CITRATE 100 MCG: 50 INJECTION INTRAMUSCULAR; INTRAVENOUS at 22:13

## 2020-01-01 RX ADMIN — SODIUM CHLORIDE, POTASSIUM CHLORIDE, SODIUM LACTATE AND CALCIUM CHLORIDE 2000 ML: 600; 310; 30; 20 INJECTION, SOLUTION INTRAVENOUS at 17:09

## 2020-01-01 RX ADMIN — CEFEPIME 2 G: 2 INJECTION, POWDER, FOR SOLUTION INTRAVENOUS at 15:14

## 2020-01-01 RX ADMIN — HYDROCORTISONE SODIUM SUCCINATE 50 MG: 100 INJECTION, POWDER, FOR SOLUTION INTRAMUSCULAR; INTRAVENOUS at 11:51

## 2020-01-01 RX ADMIN — ENOXAPARIN SODIUM 40 MG: 100 INJECTION SUBCUTANEOUS at 05:42

## 2020-01-01 RX ADMIN — INSULIN HUMAN 2 UNITS: 100 INJECTION, SOLUTION PARENTERAL at 00:01

## 2020-01-01 RX ADMIN — LEVOTHYROXINE SODIUM 112 MCG: 112 TABLET ORAL at 05:41

## 2020-01-01 RX ADMIN — FENTANYL CITRATE 50 MCG: 50 INJECTION INTRAMUSCULAR; INTRAVENOUS at 13:07

## 2020-01-01 RX ADMIN — FENTANYL CITRATE 100 MCG: 50 INJECTION INTRAMUSCULAR; INTRAVENOUS at 05:24

## 2020-01-01 RX ADMIN — SODIUM PHOSPHATE, MONOBASIC, MONOHYDRATE 30 MMOL: 276; 142 INJECTION, SOLUTION INTRAVENOUS at 08:23

## 2020-01-01 RX ADMIN — ACETAMINOPHEN 650 MG: 650 SUPPOSITORY RECTAL at 23:23

## 2020-01-01 RX ADMIN — MIDAZOLAM HYDROCHLORIDE 2 MG: 1 INJECTION, SOLUTION INTRAMUSCULAR; INTRAVENOUS at 13:05

## 2020-01-01 RX ADMIN — POTASSIUM CHLORIDE 20 MEQ: 14.9 INJECTION, SOLUTION INTRAVENOUS at 07:18

## 2020-01-01 RX ADMIN — NOREPINEPHRINE BITARTRATE 3 MCG/MIN: 1 INJECTION INTRAVENOUS at 22:14

## 2020-01-01 RX ADMIN — INSULIN HUMAN: 100 INJECTION, SOLUTION PARENTERAL at 01:57

## 2020-01-01 RX ADMIN — SODIUM CHLORIDE: 9 INJECTION, SOLUTION INTRAVENOUS at 21:58

## 2020-01-01 RX ADMIN — SODIUM CHLORIDE, POTASSIUM CHLORIDE, SODIUM LACTATE AND CALCIUM CHLORIDE 2000 ML: 600; 310; 30; 20 INJECTION, SOLUTION INTRAVENOUS at 10:40

## 2020-01-01 RX ADMIN — SODIUM CHLORIDE, POTASSIUM CHLORIDE, SODIUM LACTATE AND CALCIUM CHLORIDE: 600; 310; 30; 20 INJECTION, SOLUTION INTRAVENOUS at 20:46

## 2020-01-01 RX ADMIN — HYDROCORTISONE SODIUM SUCCINATE 50 MG: 100 INJECTION, POWDER, FOR SOLUTION INTRAMUSCULAR; INTRAVENOUS at 17:27

## 2020-01-01 RX ADMIN — NOREPINEPHRINE BITARTRATE 10 MCG/MIN: 1 INJECTION INTRAVENOUS at 23:02

## 2020-01-01 RX ADMIN — FAMOTIDINE 20 MG: 20 TABLET ORAL at 05:41

## 2020-01-01 RX ADMIN — VASOPRESSIN 0.03 UNITS/MIN: 20 INJECTION INTRAVENOUS at 15:00

## 2020-01-01 RX ADMIN — INSULIN HUMAN 3 UNITS: 100 INJECTION, SOLUTION PARENTERAL at 17:33

## 2020-01-01 RX ADMIN — FENTANYL CITRATE 25 MCG: 50 INJECTION INTRAMUSCULAR; INTRAVENOUS at 16:35

## 2020-01-01 RX ADMIN — SODIUM PHOSPHATE, MONOBASIC, MONOHYDRATE 15 MMOL: 276; 142 INJECTION, SOLUTION INTRAVENOUS at 14:18

## 2020-01-01 RX ADMIN — HYDROCORTISONE SODIUM SUCCINATE 50 MG: 100 INJECTION, POWDER, FOR SOLUTION INTRAMUSCULAR; INTRAVENOUS at 15:14

## 2020-01-01 RX ADMIN — SODIUM CHLORIDE 1000 ML: 9 INJECTION, SOLUTION INTRAVENOUS at 12:42

## 2020-01-01 RX ADMIN — FAMOTIDINE 20 MG: 10 INJECTION INTRAVENOUS at 05:58

## 2020-01-01 RX ADMIN — INSULIN GLARGINE 20 UNITS: 100 INJECTION, SOLUTION SUBCUTANEOUS at 05:42

## 2020-01-01 RX ADMIN — SODIUM CHLORIDE: 4.5 INJECTION, SOLUTION INTRAVENOUS at 22:39

## 2020-01-01 RX ADMIN — SODIUM CHLORIDE, POTASSIUM CHLORIDE, SODIUM LACTATE AND CALCIUM CHLORIDE 1000 ML: 600; 310; 30; 20 INJECTION, SOLUTION INTRAVENOUS at 08:15

## 2020-01-01 RX ADMIN — LINEZOLID 600 MG: 600 TABLET, FILM COATED ORAL at 17:27

## 2020-01-01 RX ADMIN — DEXMEDETOMIDINE HYDROCHLORIDE 0.2 MCG/KG/HR: 100 INJECTION, SOLUTION INTRAVENOUS at 10:35

## 2020-01-01 RX ADMIN — SODIUM CHLORIDE, POTASSIUM CHLORIDE, SODIUM LACTATE AND CALCIUM CHLORIDE 1000 ML: 600; 310; 30; 20 INJECTION, SOLUTION INTRAVENOUS at 16:30

## 2020-01-01 RX ADMIN — HYDROCORTISONE SODIUM SUCCINATE 50 MG: 100 INJECTION, POWDER, FOR SOLUTION INTRAMUSCULAR; INTRAVENOUS at 05:40

## 2020-01-01 RX ADMIN — LEVOTHYROXINE SODIUM 112 MCG: 112 TABLET ORAL at 07:50

## 2020-01-01 RX ADMIN — VANCOMYCIN HYDROCHLORIDE 1250 MG: 500 INJECTION, POWDER, LYOPHILIZED, FOR SOLUTION INTRAVENOUS at 17:08

## 2020-01-01 RX ADMIN — FENTANYL CITRATE 25 MCG: 50 INJECTION INTRAMUSCULAR; INTRAVENOUS at 16:15

## 2020-01-01 RX ADMIN — HALOPERIDOL LACTATE 2.5 MG: 5 INJECTION, SOLUTION INTRAMUSCULAR at 10:38

## 2020-01-01 RX ADMIN — CEFEPIME 2 G: 2 INJECTION, POWDER, FOR SOLUTION INTRAVENOUS at 05:35

## 2020-01-01 RX ADMIN — SODIUM CHLORIDE 1.5 UNITS/HR: 9 INJECTION, SOLUTION INTRAVENOUS at 02:01

## 2020-01-01 RX ADMIN — AZITHROMYCIN MONOHYDRATE 500 MG: 500 INJECTION, POWDER, LYOPHILIZED, FOR SOLUTION INTRAVENOUS at 15:27

## 2020-01-01 RX ADMIN — FAMOTIDINE 20 MG: 10 INJECTION INTRAVENOUS at 17:47

## 2020-01-01 RX ADMIN — HYDROCORTISONE SODIUM SUCCINATE 50 MG: 100 INJECTION, POWDER, FOR SOLUTION INTRAMUSCULAR; INTRAVENOUS at 23:52

## 2020-01-01 RX ADMIN — KETAMINE HYDROCHLORIDE 75 MG: 50 INJECTION, SOLUTION, CONCENTRATE INTRAMUSCULAR; INTRAVENOUS at 12:48

## 2020-01-01 RX ADMIN — VASOPRESSIN 20 UNITS: 20 INJECTION INTRAVENOUS at 01:12

## 2020-01-01 RX ADMIN — DIVALPROEX SODIUM 125 MG: 125 CAPSULE, COATED PELLETS ORAL at 05:41

## 2020-01-01 RX ADMIN — HYDROCORTISONE SODIUM SUCCINATE 50 MG: 100 INJECTION, POWDER, FOR SOLUTION INTRAMUSCULAR; INTRAVENOUS at 23:24

## 2020-01-01 RX ADMIN — INSULIN GLARGINE 20 UNITS: 100 INJECTION, SOLUTION SUBCUTANEOUS at 08:14

## 2020-01-01 RX ADMIN — FENTANYL CITRATE 50 MCG: 50 INJECTION INTRAMUSCULAR; INTRAVENOUS at 16:56

## 2020-01-01 RX ADMIN — ATROPINE SULFATE 2 DROP: 10 SOLUTION/ DROPS OPHTHALMIC at 10:24

## 2020-01-01 RX ADMIN — METRONIDAZOLE 500 MG: 500 INJECTION, SOLUTION INTRAVENOUS at 17:09

## 2020-01-01 ASSESSMENT — ACTIVITIES OF DAILY LIVING (ADL)
PHYSICAL_TRANSFER_REQUIRES_ASSISTANCE: 1
CONTINENCE_REQUIRES_ASSISTANCE: 1
DRESSING_REQUIRES_ASSISTANCE: 1
AMBULATION_REQUIRES_ASSISTANCE: 1
EATING_REQUIRES_ASSISTANCE: 1
BATHING_REQUIRES_ASSISTANCE: 1

## 2020-01-01 ASSESSMENT — COGNITIVE AND FUNCTIONAL STATUS - GENERAL
STANDING UP FROM CHAIR USING ARMS: TOTAL
PERSONAL GROOMING: TOTAL
MOVING TO AND FROM BED TO CHAIR: UNABLE
DRESSING REGULAR UPPER BODY CLOTHING: TOTAL
MOBILITY SCORE: 6
TURNING FROM BACK TO SIDE WHILE IN FLAT BAD: UNABLE
TOILETING: TOTAL
SUGGESTED CMS G CODE MODIFIER MOBILITY: CN
WALKING IN HOSPITAL ROOM: TOTAL
DRESSING REGULAR LOWER BODY CLOTHING: TOTAL
SUGGESTED CMS G CODE MODIFIER DAILY ACTIVITY: CN
MOVING FROM LYING ON BACK TO SITTING ON SIDE OF FLAT BED: UNABLE
CLIMB 3 TO 5 STEPS WITH RAILING: TOTAL
HELP NEEDED FOR BATHING: TOTAL
DAILY ACTIVITIY SCORE: 6
EATING MEALS: TOTAL

## 2020-01-01 ASSESSMENT — ENCOUNTER SYMPTOMS: SHORTNESS OF BREATH: 1

## 2020-01-28 PROBLEM — E87.20 LACTIC ACIDOSIS: Status: ACTIVE | Noted: 2020-01-01

## 2020-01-28 PROBLEM — J96.01 ACUTE RESPIRATORY FAILURE WITH HYPOXIA (HCC): Status: ACTIVE | Noted: 2020-01-01

## 2020-01-28 PROBLEM — R65.21 SEPTIC SHOCK (HCC): Status: ACTIVE | Noted: 2020-01-01

## 2020-01-28 PROBLEM — G93.40 ACUTE ENCEPHALOPATHY: Status: ACTIVE | Noted: 2020-01-01

## 2020-01-28 PROBLEM — N17.0 ACUTE RENAL FAILURE WITH TUBULAR NECROSIS (HCC): Status: ACTIVE | Noted: 2020-01-01

## 2020-01-28 PROBLEM — A41.9 SEPTIC SHOCK (HCC): Status: ACTIVE | Noted: 2020-01-01

## 2020-01-28 PROBLEM — E13.01 HYPEROSMOLAR COMA DUE TO SECONDARY DIABETES (HCC): Status: ACTIVE | Noted: 2020-01-01

## 2020-01-28 NOTE — FLOWSHEET NOTE
20 1259   Vent Clock   Vent Initiated Yes   Events/Summary/Plan   Events/Summary/Plan Intubated at 1250. EtCO2 color change.   General Vent Information   #Vent Initial Day  Yes   Ventilator Red Plug Ventilator Plugged into Red Electrical Outlet   Vent Temp HME Yes   Resuscitation Bag Resuscitation Bag and Mask at Bedside and Checked   Vent Alarms   Ventilation Alarms Reviewed / Activated Yes   Upper Pressure Limit (HI PIP) Alarm 40   Low VE (LPM) Alarm 3   High Respiratory Rate Alarm 40   Low VT Alarm 150   Apnea Parameters Checked / Activated Yes   Andres Vent   Andres Vent Yes   Andres Conventional Settings   Rate (breaths/min) 30   Vt Target (mL) 380   TI (Seconds) 0.77   PEEP/CPAP 8   FiO2 60   Sensitivity Setting Flow Trigger   Other Settings 5   Andres Measured Parameters   P Peak (PIP) 32    Minute Volume 12.1   Control VTE (exp VT) 340   f Total (Breaths/Min) 32   I:E Ratio 1:1.5   P MEAN 10   PEEP/CPAP MONITORED 8   Equipment Change   Circuit Last Changed Date 20   Circuit to be Changed Next 20   Weaning Trial   Weaning Trial Initiated No   Barriers to Wean Evidence of active shock,hemmorhage or sepsis   Respiratory WDL   Respiratory (WDL) X   Chest Exam   Work Of Breathing / Effort Moderate;Increased Work of Breathing;Vented   Breath Sounds   Pre/Post Intervention Post Intervention Assessment   RUL Breath Sounds Coarse Crackles   RML Breath Sounds Coarse Crackles   RLL Breath Sounds Diminished   MARY Breath Sounds Coarse Crackles   LLL Breath Sounds Diminished   Secretions   Cough Moist;Productive;Strong   How Sputum Obtained Endotracheal;Suction   Sputum Amount Copious   Sputum Color Green   Sputum Consistency Thin   Suction Frequency Suctioned Three or Four Times Per Encounter

## 2020-01-28 NOTE — ED TRIAGE NOTES
Pt was at facility when MD there noted a fever of 104 farenheit. Pt had a GCS of 6 when EMS arrived.

## 2020-01-28 NOTE — PALLIATIVE CARE
Palliative Care follow-up  Appreciate call from BS RN noting new referral, current situation and pt expected to arrive in the ICU. No ACP documents on file currently for her. Call placed to [facility] to determine if they have any documents on file for her. AD located and being faxed to this RN. Will scan into EMR when received. Pt currently in ER and not all documentation present yet regarding the admission, diagnosis, etc. Plan made with BS RN to monitor the chart for notes pertaining to the current admission and to f/u with  to arrange meeting regarding POC.     Updated: BS RN/PC team    Plan: formal consult to follow    Thank you for allowing Palliative Care to support this patient and family. Contact [phone] for additional assistance, change in patient status, or with any questions/concerns.

## 2020-01-28 NOTE — PROGRESS NOTES
Pt arrival to room 324; assisted to slide board to bed. BPs reading 40s/20s; Levophed titrated per orders. Call to Dr. Castro to notify him of current pt status. Orders received. Close monitoring equipment in place.  at bedside, POC discussed. Will continue with care.

## 2020-01-28 NOTE — ED NOTES
Med rec updated and complete  Allergies reviewed, per historical history   Pt was intubated.  Received MAR from [facility], per MAR show pt last took her SEROQUEL 100MG and 25MG and CLONAZEPAM 0.5MG on 1/13/2020.

## 2020-01-28 NOTE — ED NOTES
Intubated at 1252  60 mg of succ given at 1250.   White placed at 1300.   MD made aware of low BP post intubation, waited for Levophed until pressure did not respond to rest of fluid bolus. Levophed started at 0.5 per documented on MAR.

## 2020-01-28 NOTE — PROGRESS NOTES
Pharmacy Kinetics 84 y.o. female on vancomycin day # 1 2020    Currently on Vancomycin 1250 mg IV x 1 (loading dose)  Provider specified end date: to be determined    Indication for Treatment: PNA    Pertinent history per medical record: Admitted on 2020 for respiratory failure requiring intubation in ER.  Patient transferred from SNF.  CXR suggestive of PNA; UA positive.  Initiated on broad spectrum IV antibiotics pending culture results.    Other antibiotics: Cefepime 2 g IV q24h; Flagyl 500 mg IV q8h, Zithromax 500 mg IV daily    Allergies: Ciprofloxacin hydrochloride; Clarithromycin; Fenofibrate; Garlic; Keflex; Statins [hmg-coa-r inhibitors]; and Sulfa drugs     List concerns for renal function: BUN/SCr ratio > 20:1, elderly, hypermetabolic state (SIRS), pressors/hypotension, HEATHER    Pertinent cultures to date:    BCx in process   Sputum in process    MRSA nares swab if pneumonia is a concern (ordered/positive/negative/n-a): ordered    Recent Labs     20  1231   WBC 22.3*   NEUTSPOLYS 83.60*     Recent Labs     20  1231 20  1421   BUN 74* 71*   CREATININE 1.95* 2.24*   ALBUMIN 3.2  --      No results for input(s): VANCOTROUGH, VANCOPEAK, VANCORANDOM in the last 72 hours.No intake or output data in the 24 hours ending 20 1547   BP (!) 53/31   Pulse (!) 118   Temp 36.2 °C (97.1 °F) (Temporal)   Resp (!) 29   Wt 52.1 kg (114 lb 13.8 oz)   SpO2 93%  Temp (24hrs), Av.2 °C (97.1 °F), Min:36.2 °C (97.1 °F), Max:36.2 °C (97.1 °F)      A/P   1. Vancomycin dose change: pulse dose  2. Next vancomycin level: 20 at 1600  3. Goal trough: 16-20 mcg/ml  4. Comments: 24 hour level to dictate dosing regimen    Deng Ambrosio, KirbyD

## 2020-01-28 NOTE — FLOWSHEET NOTE
01/28/20 1232   Events/Summary/Plan   Events/Summary/Plan Brought in by Jennifer from [facility].  Patient DNR/DNI   Respiratory WDL   Respiratory (WDL) X   Chest Exam   Work Of Breathing / Effort Moderate;Increased Work of Breathing   Respiration (!) 60   Pulse (!) 133   Breath Sounds   Pre/Post Intervention Pre Intervention Assessment   RUL Breath Sounds Crackles   RML Breath Sounds Crackles   RLL Breath Sounds Diminished   MARY Breath Sounds Crackles   LLL Breath Sounds Diminished   Secretions   Cough Congested;Moist;Non Productive;Strong   How Sputum Obtained Spontaneous   Oximetry   #Pulse Oximetry (Single Determination) Yes   Oxygen   Pulse Oximetry 89 %   O2 (LPM) 15   O2 Daily Delivery Respiratory  Non Rebreather Mask

## 2020-01-28 NOTE — ED PROVIDER NOTES
ED Provider Note  CHIEF COMPLAINT  Chief Complaint   Patient presents with   • ALOC       HPI  Cl Blue is a 84 y.o. female who presents with altered mental status.  It is unclear how many days the patient has been sick.  She was transferred here from a nursing home.  Doctor at the nursing home was concerned that she was septic.  Unable to obtain any further history.  In route patient was febrile, hypotensive with difficulty breathing.  She was on a nonrebreather and satting 88 to 90%.    REVIEW OF SYSTEMS  Unable to obtain secondary to the patient's altered mental status.    PAST MEDICAL HISTORY  Past Medical History:   Diagnosis Date   • Hyperlipidemia 2011   • Restless leg 2011   • Diabetes     type 2 - non medicated   • Endometriosis    • Infectious disease     staph infection   • Pneumonia    • S/P appendectomy    • S/P hysterectomy    • Thalassemia    • Tonsillar enlargement     removed   • Unspecified disorder of thyroid        FAMILY HISTORY  [unfilled]    SOCIAL HISTORY  Social History     Socioeconomic History   • Marital status:      Spouse name: Not on file   • Number of children: Not on file   • Years of education: Not on file   • Highest education level: Not on file   Occupational History   • Not on file   Social Needs   • Financial resource strain: Not on file   • Food insecurity:     Worry: Not on file     Inability: Not on file   • Transportation needs:     Medical: Not on file     Non-medical: Not on file   Tobacco Use   • Smoking status: Former Smoker     Years: 30.00     Last attempt to quit: 2000     Years since quittin.5   • Smokeless tobacco: Never Used   Substance and Sexual Activity   • Alcohol use: No   • Drug use: No   • Sexual activity: Not on file   Lifestyle   • Physical activity:     Days per week: Not on file     Minutes per session: Not on file   • Stress: Not on file   Relationships   • Social connections:     Talks on phone: Not on file      Gets together: Not on file     Attends Restorationist service: Not on file     Active member of club or organization: Not on file     Attends meetings of clubs or organizations: Not on file     Relationship status: Not on file   • Intimate partner violence:     Fear of current or ex partner: Not on file     Emotionally abused: Not on file     Physically abused: Not on file     Forced sexual activity: Not on file   Other Topics Concern   • Not on file   Social History Narrative   • Not on file       SURGICAL HISTORY  Past Surgical History:   Procedure Laterality Date   • COLONOSCOPY  4/2011    [facility]/ ; ILEAL EROSIONS   • EGD WITH ASP/BX  4/2011    [facility]/ DR SUMMERS; neg; +for h.pylori-rxd   • EMMY BY LAPAROSCOPY  2003    dr clark   • HYSTERECTOMY, TOTAL ABDOMINAL  1970    endometriosis   • CATARACT EXTRACTION WITH IOL     • GYN SURGERY      bladder suspension   • OTHER      thyroid surgry   • OTHER ABDOMINAL SURGERY      gallbladder removal   • OTHER ORTHOPEDIC SURGERY      c5-6 fusion        CURRENT MEDICATIONS  Home Medications    **Home medications have not yet been reviewed for this encounter**         ALLERGIES  Allergies   Allergen Reactions   • Bloodless    • Ciprofloxacin Hydrochloride Rash   • Clarithromycin Rash   • Fenofibrate    • Garlic Vomiting   • Keflex Rash   • Statins [Hmg-Coa-R Inhibitors]    • Sulfa Drugs Anaphylaxis       PHYSICAL EXAM  VITAL SIGNS: Pulse (!) 133   Temp 36.2 °C (97.1 °F) (Temporal)   Resp (!) 60   Wt 54.9 kg (121 lb)   LMP 11/16/1970   SpO2 89%   BMI 22.13 kg/m²       Constitutional: Chronically ill-appearing, in severe respiratory distress, altered  HENT: Normocephalic, Atraumatic, poor dentition, extremely dry oral mucosa, dry pill stuck to the roof of her mouth  Eyes: PERRL, EOMI, Conjunctiva normal  Neck: Normal range of motion, No tenderness, Supple, No stridor.   Cardiovascular: Tachycardic  Thorax & Lungs: Tachypneic, retracting, severe respiratory distress, no  wheezes  Abdomen: No abdominal distention, no rebound or guarding, no pulsatile mass appreciated  Skin: Warm, Dry, No erythema, No rash.   Back: No obvious deformities  Extremities: Lower extremities without edema  Neurologic: Unresponsive to pain or voice, occasionally has some spontaneous movements of her arms and legs, no focal weakness noted,         Labs  Results for orders placed or performed during the hospital encounter of 01/28/20   CBC WITH DIFFERENTIAL   Result Value Ref Range    WBC 22.3 (H) 4.8 - 10.8 K/uL    RBC 5.11 4.20 - 5.40 M/uL    Hemoglobin 10.3 (L) 12.0 - 16.0 g/dL    Hematocrit 33.9 (L) 37.0 - 47.0 %    MCV 66.3 (L) 81.4 - 97.8 fL    MCH 20.2 (L) 27.0 - 33.0 pg    MCHC 30.4 (L) 33.6 - 35.0 g/dL    RDW 37.5 35.9 - 50.0 fL    Platelet Count 447 (H) 164 - 446 K/uL    MPV 13.0 (H) 9.0 - 12.9 fL    Neutrophils-Polys 83.60 (H) 44.00 - 72.00 %    Lymphocytes 8.40 (L) 22.00 - 41.00 %    Monocytes 6.50 0.00 - 13.40 %    Eosinophils 0.00 0.00 - 6.90 %    Basophils 0.80 0.00 - 1.80 %    Immature Granulocytes 0.70 0.00 - 0.90 %    Nucleated RBC 0.20 /100 WBC    Neutrophils (Absolute) 18.63 (H) 2.00 - 7.15 K/uL    Lymphs (Absolute) 1.86 1.00 - 4.80 K/uL    Monos (Absolute) 1.44 (H) 0.00 - 0.85 K/uL    Eos (Absolute) 0.01 0.00 - 0.51 K/uL    Baso (Absolute) 0.18 (H) 0.00 - 0.12 K/uL    Immature Granulocytes (abs) 0.15 (H) 0.00 - 0.11 K/uL    NRBC (Absolute) 0.05 K/uL   COMP METABOLIC PANEL   Result Value Ref Range    Sodium 145 135 - 145 mmol/L    Potassium 5.7 (H) 3.6 - 5.5 mmol/L    Chloride 106 96 - 112 mmol/L    Co2 20 20 - 33 mmol/L    Anion Gap 19.0 (H) 0.0 - 11.9    Glucose 893 (HH) 65 - 99 mg/dL    Bun 74 (HH) 8 - 22 mg/dL    Creatinine 1.95 (H) 0.50 - 1.40 mg/dL    Calcium 8.2 (L) 8.4 - 10.2 mg/dL    AST(SGOT) 45 12 - 45 U/L    ALT(SGPT) 37 2 - 50 U/L    Alkaline Phosphatase 78 30 - 99 U/L    Total Bilirubin 0.5 0.1 - 1.5 mg/dL    Albumin 3.2 3.2 - 4.9 g/dL    Total Protein 6.6 6.0 - 8.2 g/dL     Globulin 3.4 1.9 - 3.5 g/dL    A-G Ratio 0.9 g/dL   proBrain Natriuretic Peptide, NT   Result Value Ref Range    NT-proBNP 1199 (H) 0 - 125 pg/mL   TROPONIN   Result Value Ref Range    Troponin T 86 (H) 6 - 19 ng/L   URINALYSIS   Result Value Ref Range    Color Yellow     Character Cloudy (A)     Ph 5.0 5.0 - 8.0    Glucose 500 (A) Negative mg/dL    Ketones Trace (A) Negative mg/dL    Protein Negative Negative mg/dL    Bilirubin Negative Negative    Nitrite Negative Negative    Leukocyte Esterase Small (A) Negative    Occult Blood Moderate (A) Negative    Micro Urine Req Microscopic    LACTIC ACID   Result Value Ref Range    Lactic Acid 5.1 (HH) 0.5 - 2.0 mmol/L   ESTIMATED GFR   Result Value Ref Range    GFR If African American 30 (A) >60 mL/min/1.73 m 2    GFR If Non African American 25 (A) >60 mL/min/1.73 m 2   REFRACTOMETER SG   Result Value Ref Range    Specific Gravity 1.023    URINE MICROSCOPIC (W/UA)   Result Value Ref Range    WBC Packed (A) /hpf    RBC 5-10 (A) /hpf    Bacteria Many (A) None /hpf   ARTERIAL BLOOD GAS w/ O2 (LAB)   Result Value Ref Range    Ph 7.24 (LL) 7.40 - 7.50    Pco2 41.9 (H) 26.0 - 37.0 mmHg    Po2 87.9 (H) 64.0 - 87.0 mmHg    O2 Saturation 93.3 93.0 - 99.0 %    Hco3 18 17 - 25 mmol/L    Base Excess -9 (L) -4 - 3 mmol/L    Body Temp see below Centigrade   Basic Metabolic Panel   Result Value Ref Range    Sodium 144 135 - 145 mmol/L    Potassium 5.8 (H) 3.6 - 5.5 mmol/L    Chloride 106 96 - 112 mmol/L    Co2 18 (L) 20 - 33 mmol/L    Glucose 906 (HH) 65 - 99 mg/dL    Bun 71 (HH) 8 - 22 mg/dL    Creatinine 2.24 (H) 0.50 - 1.40 mg/dL    Calcium 8.0 (L) 8.4 - 10.2 mg/dL    Anion Gap 20.0 (H) 0.0 - 11.9   ESTIMATED GFR   Result Value Ref Range    GFR If African American 25 (A) >60 mL/min/1.73 m 2    GFR If Non African American 21 (A) >60 mL/min/1.73 m 2   Blood Glucose   Result Value Ref Range    Glucose 878 (HH) 65 - 99 mg/dL   LACTIC ACID   Result Value Ref Range    Lactic Acid 10.8 (HH)  0.5 - 2.0 mmol/L   Blood Glucose   Result Value Ref Range    Glucose 636 (HH) 65 - 99 mg/dL   EKG   Result Value Ref Range    Report       [facility]    Test Date:  2020  Pt Name:    CLARITZA DIAZ             Department: [facility]  MRN:        [id number]                      Room:       3324  Gender:     Female                       Technician: [id number]  :        [id number]                   Requested By:FABI KNOX  Order #:    [id number]                    Reading MD: Fabi Powell    Measurements  Intervals                                Axis  Rate:       132                          P:          81  KS:         117                          QRS:        -19  QRSD:       66                           T:          11  QT:         328  QTc:        486    Interpretive Statements  Sinus tachycardia  Paired ventricular premature complexes  Aberrant conduction of SV complex(es)  Inferior infarct, age indeterminate  Lateral leads are also involved  Compared to ECG 2012 21:17:52  Ventricular premature complex(es) now present  Aberrant conduction of supraventricular beat(s) now pre sent  Sinus rhythm no longer present  ST (T wave) deviation no longer present  Electronically Signed On 2020 14:28:51 PST by Fabi Powell         RADIOLOGY/PROCEDURES  DX-CHEST-PORTABLE (1 VIEW)   Final Result      New lingula and left lower lobe consolidation is highly worrisome for pneumonia. Recommend follow-up to resolution          COURSE & MEDICAL DECISION MAKING  Pertinent Labs & Imaging studies reviewed. (See chart for details)    Patient presented to the emergency department with altered mental status and significant dyspnea.  Patient was in significant respiratory distress hypoxic, tachypneic and hypotensive.  Patient was transferred from a nursing home.  On the nursing home paperwork it stated DNR.  There is also a piece of paper that stated DNR from 2016.  However there is no  official DNR documentation in the paperwork.  The  arrived shortly after the patient arrived and stated he knew nothing about the DNR.  He stated he wanted everything done.  I discussed with him that this was likely his wife's wishes as it is in the paperwork.  However he states he did not know that that was his wife's wishes and does not think that is accurate.  He states he wants everything done.  I tried to review the chart but the patient has not been here since 2014 and at that time she was full code.  Therefore without any further ability to support the DNR documentation patient was switched to full code.  Sepsis protocol was followed.  Patient has a significant number of allergies.  I discussed this with pharmacy and we have seen that she has tolerated Rocephin in the past and therefore she was given 2 g of Rocephin.    Patient was intubated.  After the intubation I spoke with the  further.  He now states that the patient has not been eating or drinking for the past couple of weeks.  He states the patient has end-stage Alzheimer's.  With this diagnosis I am more suspicious that perhaps the DNR was accurate and representing the patient's wishes.  However the  insists that everything must be done.  I have covered her for antibiotics, treat her with fluids resuscitation and placed on pressors for her sepsis.  I do believe she is extremely ill and this may not be survivable.  I did however not place a central line as I feel palliative care needs to come and consult with the  as we may not be fully following the patient's wishes especially in light of her significant dementia.    Chest x-ray shows evidence of pneumonia.  Patient was suctioned for sputum culture and it looked almost like the Ensure that she is been taking in her lungs.  White was placed and urine was dark and cloudy.  I suspect she also has a urinary tract infection.  Lactic acid is returned at 5.1.  Patient received 2 L  of fluid per sepsis protocol.  She still remained hypotensive.  Therefore she was placed on norepinephrine.  Patient was extremely hyper glycemic as well.  Her CO2 was 20.  Therefore I do not suspect DKA at this time.  Suspect this is HHS.  Labs also suggests extreme dehydration.  We will continue IV fluids.  I discussed the case with  who requests a palliative care consult.  Patient be transferred to the ICU for further evaluation.    EMERGENT INTUBATION PROCEDURE NOTE  INDICATION: Respiratory distress  TECHNIQUE: Oral tracheal intubation with glide scope  After pre-oxygenating the patient for 3 minutes, a modified rapid-sequence induction was performed using ketamine due to her respiratory distress and my concern for sepsis and succinylcholine with cricoid pressure. Using a 3 oh glide scope blade was used.  Patient had extremely tight jaw and it was somewhat difficult to intubate the patient.  She also had extremely dry oral mucosa.  There was white powdered pill fragments on the roof of her mouth.  She was suctioned.  Patient did desaturate to 87% during the intubation.  And a 7.0 endotracheal tube was placed and secured.  Placement was confirmed with auscultation and end-tidal CO2.  COMPLICATIONS: A very brief episode of less than 10 seconds of desaturation to 87% while securing the tube.  Patient however was hypoxic and even with bagging we are unable to get her oxygenation above about 90% prior to intubation.    CRITICAL CARE  The very real possibilty of a deterioration of this patient's condition required the highest level of my preparedness for sudden, emergent intervention.  I provided critical care services, which included medication orders, frequent reevaluations of the patient's condition and response to treatment, ordering and reviewing test results, and discussing the case with various consultants.  The critical care time associated with the care of the patient was 45 minutes. Review chart for  interventions. This time is exclusive of any other billable procedures.       FINAL IMPRESSION  1. Severe sepsis with septic shock  2. Pneumonia Left lower lobe  3. Uti  4. Dehydration  5. Respiratory failure s/p intubation  6. Hyperosmolar nonketotic hyperglycemia  7. Elevated troponin         Electronically signed by: Fabi Camara M.D., 1/28/2020 1:03 PM

## 2020-01-29 PROBLEM — E87.0 HYPERNATREMIA: Status: ACTIVE | Noted: 2020-01-01

## 2020-01-29 NOTE — FLOWSHEET NOTE
20 0236   Ventilator Management Group   Intensivist Group Yes   Ventilator Identifier   Ventilator Number [id number]   General Vent Information   #Ventilator Subsequent Day Yes   Ventilator Red Plug Ventilator Plugged into Red Electrical Outlet   Vent Temp (Celsius) 35   Pulse Oximetry 100 %   Pulse 66   Resuscitation Bag Resuscitation Bag and Mask at Bedside and Checked   CO2 Monitoring   EtCO2 Calibration Yes   #ETCO2 15   Vent Alarms   Ventilation Alarms Reviewed / Activated Yes   Upper Pressure Limit (HI PIP) Alarm 40   Low VE (LPM) Alarm 4   High Respiratory Rate Alarm 40   Low Respiratory Rate Alarm 8   Low VT Alarm 100   Apnea Parameters Checked / Activated Yes   EtCO2 High Alarm 60   EtCO2 Low Alarm 15   Andres Vent   Andres Vent Yes   Andres Conventional Settings   Rate (breaths/min) 24   Vt Target (mL) 450   TI (Seconds) 0.9   PEEP/CPAP 8   Pramp 50   FiO2 60   Sensitivity Setting Flow Trigger   Other Settings 5   Andres Measured Parameters   P Peak (PIP) 29    Minute Volume 11   Control VTE (exp VT) 451   f Total (Breaths/Min) 24   I:E Ratio 1:1.8   P MEAN 8   PEEP/CPAP MONITORED 8   Static Compliance (ml / cm H2O) 28   R exp 19   Respiratory WDL   Respiratory (WDL) WDL   Chest Exam   Work Of Breathing / Effort Vented

## 2020-01-29 NOTE — DISCHARGE PLANNING
LSW spoke with [facility]. Pt is a long term resident with both Medicare and Medicaid. Pt would be eligible to return to SNF with hospice if appropriate.     LSW spoke with spouse and son at bedside. Spouse Bry signed consent for [facility]. LSW answered questions and provided support.       Referral faxed to [facility]

## 2020-01-29 NOTE — PROGRESS NOTES
Cortrak Placement    Tube Team verified patient name and medical record number prior to tube placement.  Cortrak tube (43 inches, 10 Iraqi) placed at 65 cm in right nare.  Per Cortrak picture, tube appears to be in the small bowel.  Nursing Instructions: Awaiting KUB to confirm placement before use for medications or feeding. Once placement confirmed, flush tube with 30 ml of water, and then remove and save stylet, in patient medication drawer.

## 2020-01-29 NOTE — THERAPY
Occupational therapy-  OT orders rec'd.  Pt currently on ventilator and sedated to prevent agitation and fighting the ventilator.  Not following commands.  Pt normally wheelchair bound at baseline, has been living at [facility] for 4 years.  Pt does not appear able to participate in OT evaluation at this time, will monitor for improvement in status/ability to participate in therapy.  Also noted plan for family meeting today to discuss long term plan of care.  OT will monitor and eval if appropriate.

## 2020-01-29 NOTE — FLOWSHEET NOTE
20 1702   Events/Summary/Plan   Events/Summary/Plan RR to 24 ,  ml,  V.ODeny Castro.   General Vent Information   Ventilator Red Plug Ventilator Plugged into Red Electrical Outlet   Vent Temp (Celsius) 36   Pulse Oximetry 96 %   Pulse (!) 106   Resuscitation Bag Resuscitation Bag and Mask at Bedside and Checked   CO2 Monitoring   #ETCO2 23   Vent Alarms   Upper Pressure Limit (HI PIP) Alarm 40   Low VE (LPM) Alarm 4   High Respiratory Rate Alarm 40   Low Respiratory Rate Alarm 8   Low VT Alarm 150   Apnea Parameters Checked / Activated Yes   EtCO2 High Alarm 60   EtCO2 Low Alarm 15   Andres Conventional Settings   Rate (breaths/min) 24   Vt Target (mL) 450   TI (Seconds) 0.9   PEEP/CPAP 8   Pramp 50   FiO2 60   Sensitivity Setting Flow Trigger   Other Settings 5   Andres Measured Parameters   P Peak (PIP) 32    Minute Volume 10.9   Control VTE (exp VT) 450   f Total (Breaths/Min) 24   I:E Ratio 1:1.9   P MEAN 8   PEEP/CPAP MONITORED 8   Static Compliance (ml / cm H2O) 23   R exp 25   Plateau Pressure (Q Shift) 24   Secretions   Cough Moist;Productive;Strong   How Sputum Obtained Endotracheal;Suction   Sputum Amount Moderate;Large   Sputum Color Grey;Yellow;Bloody   Sputum Consistency Thin;Thick   Suction Frequency Suctioned Three or Four Times Per Encounter

## 2020-01-29 NOTE — THERAPY
PT order received; Pt is currently on ventilator and sedated. Pt is not following commands at this time and is not appropriate for skilled therapy interventions. Will hold once medically appropriate.

## 2020-01-29 NOTE — PROGRESS NOTES
Discussion with Dr. Black regarding patient's lab work. Per MD, RN to place cortrak and initiate 250 ml free water flushes every 6 hours and change IV fluids to 1/2 NS @ 75 ml/hr.

## 2020-01-29 NOTE — PROCEDURES
Central Line Insertion  Date/Time: 1/28/2020 5:51 PM  Performed by: Robby Castro M.D.  Authorized by: Robby Castro M.D.     Consent:     Consent obtained:  Written    Consent given by:  Spouse    Risks discussed:  Arterial puncture, incorrect placement, nerve damage, pneumothorax, infection and bleeding    Alternatives discussed:  No treatment, delayed treatment and alternative treatment  Pre-procedure details:     Hand hygiene: Hand hygiene performed prior to insertion      Sterile barrier technique: All elements of maximal sterile technique followed      Skin preparation:  2% chlorhexidine  Sedation:     Sedation type: Intubated and sedated.  Anesthesia:     Anesthesia method:  Local infiltration    Local anesthetic:  Lidocaine 1% w/o epi  Procedure details:     Location:  R internal jugular    Patient position:  Trendelenburg    Procedural supplies:  Triple lumen    Catheter size:  6.5 Fr    Landmarks identified: yes      Ultrasound guidance: yes      Sterile ultrasound techniques: Sterile gel and sterile probe covers were used      Number of attempts:  2    Successful placement: yes    Post-procedure details:     Post-procedure:  Dressing applied and line sutured    Assessment:  Blood return through all ports and no pneumothorax on x-ray    Patient tolerance of procedure:  Tolerated well, no immediate complications

## 2020-01-29 NOTE — DISCHARGE PLANNING
Received Choice form at 5073 (order@ 1500)   Agency/Facility Name: [facility]  Referral sent per Choice form @ 1943

## 2020-01-29 NOTE — PROGRESS NOTES
Report received. Assumed care of patient. All lines and drips verified. Patient coughing against ventilator and stacking breaths, fentanyl titrated up. Blood sugar being checked every hour per protocol. Bilateral soft wrist restraints in place and verified. All safety precautions in place. Will continue to monitor.

## 2020-01-29 NOTE — ASSESSMENT & PLAN NOTE
-- Secondary aspiration given reported intubation by Dr. Camara with copious ensure like secretions    -- Intubated 1/28-present   -- Meeting goals of oxygenation and ventilation   -- Cont linezolid for MRSA pneumonia   -- On chlorhexidine   -- HOB elevation

## 2020-01-29 NOTE — PROGRESS NOTES
Dr. Castro at bedside to place central line and arterial line; consent signed by  after much discussion with Dr. Castro about plan of care and procedures involved. All questions/concerns addressed with son and  by Dr. Castro. Pt medicated PRN fentanyl per Dr. Castro prior to procedure.

## 2020-01-29 NOTE — RESPIRATORY CARE
Adult Ventilation Update    Total Vent Days: 1    Patient Lines/Drains/Airways Status    Active Airway     Name: Placement date: Placement time: Site: Days:    Airway ETT Oral 7.0  01/28/20   1316   Oral  less than 1              Current vent settings;  APV/CMV, 24 , 450 ml, PEEP +8, 60%             Plateau Pressure (Q Shift): 24   Static Compliance (ml / cm H2O): 23     Barriers to Wean: Evidence of active shock,hemmorhage or sepsis     Sputum/Suction  Cough: Moist;Productive;Strong   Sputum Amount: Moderate;Large   Sputum Color: Grey;Yellow;Bloody  Sputum Consistency: Thin;Thick          Events/Summary/Plan: RR to 24 ,  ml,  V.O. Dr. Castro.   The plan is to give patient 48 hours for improvement. If no improvement, withdraw support.

## 2020-01-29 NOTE — CARE PLAN
Problem: Nutritional:  Goal: Achieve adequate nutritional intake  Description  If agreeable to family, initiate nutrition support when medically feasible.    Outcome: NOT MET

## 2020-01-29 NOTE — PROGRESS NOTES
Critical Care Progress Note    Date of admission  1/28/2020    Chief Complaint  84 y.o. female admitted 1/28/2020 with acute hypoxia respiratory failure and septic shock.     Hospital Course    84 y.o. female who presented 1/28/2020 with acute hypoxia respiratory failure and shock. Patient with history of DM, hyperlipidemia, dementia, and prior history of left hip fracture who's wheelchair bound presents brought in by EMS from [facility] for shortness of breath evaluation. Per  Greg, patient has been at the [facility] for the past four years after her left hip surgery. Patient has frequent pneumonia requiring antibiotic from time to time. Over the past three weeks, patient has stopped eating and was started on ensure. No reported chest pain, fever/chills, SOB, dysuria, or N/V. Earlier this morning patient's  was called and notified about worsening shortness of breath which she was taken to the ED.       In the ED, labs notable for WBC 22.3, Hb 10.3, lactate 5.1, BUN 74, creatinine 19.95, and glucose 893. She was noted to be tachypnea and hypoxia. Code status from [facility] indicated patient was DNR. However, patient's  was not aware of the code status and requested her to be full code. Patient was intubated in the ED and noted copious yellow secretions. She was given ceftriaxone, unasyn, 2 liters bolus, started on levophed, and transferred to the ICU.      In the ICU, her levophed escalated to 30 mcg. She was given additional one liter bolus and started on vasopressin and hydrocortisone. Abx broaden to vanc/cefepime/azithromcyin. Discussed about goals of care with patient's  Greg and son Austin at bedside with the presence of JAQUELINE Joseph. Updated family about her clinical status due to septic shock with multiorgan failure. Greg and Austin expressed understanding and would like patient to be DNR but continue maximal medical therapy.     Interval Problem Update  Reviewed last 24 hour events:  Lactate  increased to 10.1 and additional fluids were given    Levo down to 3 mcg and off vasopressin this morning   Additional 1 liter LR bolus given    Transitioned to lantus SC and insulin sliding scale   Agitated when off fentanyl infusion    Added precedex    Pending palliative care consultation today at noon   Updated  at bedside     Review of Systems  Review of Systems   Unable to perform ROS: Acuity of condition        Vital Signs for last 24 hours   Temp:  [36.2 °C (97.1 °F)] 36.2 °C (97.1 °F)  Pulse:  [0-202] 83  Resp:  [10-60] 10  BP: ()/(31-92) 99/49  SpO2:  [74 %-100 %] 97 %    Hemodynamic parameters for last 24 hours  CVP:  [25 MM HG-38 MM HG] 25 MM HG    Respiratory Information for the last 24 hours  Rate (breaths/min): 12  Vt Target (mL): 400  PEEP/CPAP: 8  FiO2: 60  P MEAN: 12  Control VTE (exp VT): 399    Physical Exam   Physical Exam  Constitutional:       Comments: Intubated and sedated. Agitated when off sedation.    HENT:      Head: Normocephalic.      Nose: Nose normal.      Mouth/Throat:      Mouth: Mucous membranes are moist.   Eyes:      Pupils: Pupils are equal, round, and reactive to light.   Neck:      Musculoskeletal: Normal range of motion.   Cardiovascular:      Rate and Rhythm: Normal rate and regular rhythm.      Pulses: Normal pulses.      Heart sounds: No murmur. No friction rub.   Pulmonary:      Comments: Coarse breath sounds bilaterally   Abdominal:      Comments: Distended, no guarding, hypoactive bowel sounds      Musculoskeletal:      Comments: Trace of pitting edema      Skin:     General: Skin is warm.   Neurological:      Comments: Unable to assess          Medications  Current Facility-Administered Medications   Medication Dose Route Frequency Provider Last Rate Last Dose   • QUEtiapine (SEROQUEL) tablet 25 mg  25 mg Oral BID Robby Castro M.D.   25 mg at 01/29/20 0750   • levothyroxine (SYNTHROID) tablet 112 mcg  112 mcg Enteral Tube AM ES Robby Castro M.D.   112 mcg  at 01/29/20 0750   • sodium phosphate 30 mmol in 1/2  mL ivpb  30 mmol Intravenous Once Robby Castro M.D. 83.3 mL/hr at 01/29/20 0823 30 mmol at 01/29/20 0823   • sodium phosphate 15 mmol in  mL ivpb  15 mmol Intravenous Once Robby Castro M.D.       • [START ON 1/30/2020] famotidine (PEPCID) injection 20 mg  20 mg Intravenous DAILY Robby Castro M.D.        Or   • [START ON 1/30/2020] famotidine (PEPCID) tablet 20 mg  20 mg Enteral Tube DAILY Robby Castro M.D.       • insulin glargine (LANTUS) injection 20 Units  20 Units Subcutaneous QAM INSULIN Robby Castro M.D.   20 Units at 01/29/20 0814   • insulin regular (HUMULIN R) injection 2-9 Units  2-9 Units Subcutaneous Q6HRS Robby Castro M.D.   Stopped at 01/29/20 0745    And   • glucose 4 g chewable tablet 16 g  16 g Oral Q15 MIN PRN Robby Castro M.D.        And   • DEXTROSE 10% BOLUS 250 mL  250 mL Intravenous Q15 MIN PRN Robby Castro M.D.       • dexmedetomidine (PRECEDEX) 400 mcg in  mL infusion  0.1-1.5 mcg/kg/hr Intravenous Continuous Robby Castro M.D. 2.6 mL/hr at 01/29/20 1035 0.2 mcg/kg/hr at 01/29/20 1035   • lactated ringers infusion  2,000 mL Intravenous Once Robby Castro M.D.       • fentaNYL (SUBLIMAZE) injection 100 mcg  100 mcg Intravenous Once Fabi Camara M.D.   Stopped at 01/28/20 1330    And   • fentaNYL (SUBLIMAZE) injection 100 mcg  100 mcg Intravenous Q HOUR PRN Fabi Camara M.D.   50 mcg at 01/28/20 1656   • lactated ringers infusion (BOLUS): BMI less than or equal to 30  30 mL/kg Intravenous Once PRN Fabi Camara M.D.       • lactated ringers infusion (BOLUS)  500 mL Intravenous Once PRN Fabi Camara M.D.       • norepinephrine (LEVOPHED) 8 mg in  mL Infusion  0-30 mcg/min Intravenous Continuous Robby Castro M.D. 18.8 mL/hr at 01/29/20 0823 10 mcg/min at 01/29/20 0823   • MD Alert...Vancomycin per Pharmacy   Other PHARMACY TO DOSE Robby Castro M.D.       • hydrocortisone sodium succinate PF (SOLU-CORTEF) 100 MG injection 50  mg  50 mg Intravenous Q6HRS Robby Castro M.D.   50 mg at 01/29/20 0558   • azithromycin (ZITHROMAX) 500 mg in D5W 250 mL IVPB  500 mg Intravenous Q24HRS Robby Castro M.D.   Stopped at 01/29/20 0808   • insulin regular human (HUMULIN/NOVOLIN R) 62.5 Units in  mL infusion per protocol  0-29 Units/hr Intravenous Continuous Robby Castro M.D.   Stopped at 01/29/20 0953   • DEXTROSE 10% BOLUS 125-250 mL  125-250 mL Intravenous PRN Robby Castro M.D.       • cefepime (MAXIPIME) 2 g in  mL IVPB  2 g Intravenous Q24HRS Robby Castro M.D.   Stopped at 01/29/20 0605   • metroNIDAZOLE (FLAGYL) IVPB 500 mg  500 mg Intravenous Q8HRS Robby Castro M.D.   Stopped at 01/29/20 0706   • acetaminophen (TYLENOL) suppository 650 mg  650 mg Rectal Q4HRS PRN Robby Castro M.D.   650 mg at 01/28/20 2323   • fentaNYL (SUBLIMAZE) 50 mcg/mL in 50mL (Continuous Infusion)   Intravenous Continuous Robby Castro M.D. 4 mL/hr at 01/29/20 0953 200 mcg/hr at 01/29/20 0953   • Respiratory Care per Protocol   Nebulization Continuous RT Robby Castro M.D.       • enoxaparin (LOVENOX) inj 40 mg  40 mg Subcutaneous DAILY Robby Castro M.D.   40 mg at 01/29/20 0558   • acetaminophen (TYLENOL) tablet 650 mg  650 mg Oral Q6HRS PRN Robby Castro M.D.       • Respiratory Care per Protocol   Nebulization Continuous RT Robby Castro M.D.       • ipratropium-albuterol (DUONEB) nebulizer solution  3 mL Nebulization Q2HRS PRN (RT) Robby Castro M.D.       • senna-docusate (PERICOLACE or SENOKOT S) 8.6-50 MG per tablet 2 Tab  2 Tab Enteral Tube BID Robby Castro M.D.   Stopped at 01/28/20 1800    And   • polyethylene glycol/lytes (MIRALAX) PACKET 1 Packet  1 Packet Enteral Tube QDAY PRN Robby Castro M.D.        And   • magnesium hydroxide (MILK OF MAGNESIA) suspension 30 mL  30 mL Enteral Tube QDAY PRN Robby Castro M.D.        And   • bisacodyl (DULCOLAX) suppository 10 mg  10 mg Rectal QDAY PRSUNNI Castro M.D.       • MD Alert...ICU Electrolyte Replacement per Pharmacy   Other  PHARMACY TO DOSE Robby Castro M.D.       • lidocaine (XYLOCAINE) 1 % injection 1-2 mL  1-2 mL Tracheal Tube Q30 MIN PRN Robby Castro M.D.           Fluids    Intake/Output Summary (Last 24 hours) at 1/29/2020 1037  Last data filed at 1/29/2020 1000  Gross per 24 hour   Intake 11127.4 ml   Output 715 ml   Net 89749.4 ml       Laboratory  Recent Labs     01/28/20  1318 01/28/20  1751 01/29/20  0509   [id number] 7.24*  --   --    [id number] 41.9*  --   --    [id number] 87.9*  --   --    SBCF3VUG 93.3  --   --    ARTHCO3 18  --   --    ARTBE -9*  --   --    ISTATAPH  --  7.274* 7.544*   ISTATAPCO2  --  30.4 24.8*   ISTATAPO2  --  73 87   ISTATATCO2  --  15* 22   [id number]  --  92* 98   ISTATARTHCO3  --  14.1* 21.4   ISTATARTBE  --  -12* -1   ISTATTEMP  --  100.0 F 99.0 F   ISTATFIO2  --  60 60   ISTATSPEC  --  Arterial Arterial   ISTATAPHTC  --  7.263* 7.541*   [id number]  --  76 88*         Recent Labs     01/29/20  0300 01/29/20  0500 01/29/20  0905   SODIUM 150* *   POTASSIUM 4.0 RR 3.7   CHLORIDE 116* *   CO2 19* RR 19*   BUN 53* RR 48*   CREATININE 1.30 RR 1.15   MAGNESIUM 1.8  --   --    PHOSPHORUS 0.8*  --   --    CALCIUM 8.2* RR 7.5*     Recent Labs     01/28/20  1231  01/29/20  0300 01/29/20  0500 01/29/20  0905   ALTSGPT 37  --  30  --   --    ASTSGOT 45  --  33  --   --    ALKPHOSPHAT 78  --  54  --   --    TBILIRUBIN 0.5  --  0.3  --   --    GLUCOSE 893*   < > 150* *    < > = values in this interval not displayed.     Recent Labs     01/28/20  1231 01/29/20  0300   WBC 22.3* 21.0*   NEUTSPOLYS 83.60* 20.00*   LYMPHOCYTES 8.40* 15.00*   MONOCYTES 6.50 8.00   EOSINOPHILS 0.00 0.00   BASOPHILS 0.80 1.00   ASTSGOT 45 33   ALTSGPT 37 30   ALKPHOSPHAT 78 54   TBILIRUBIN 0.5 0.3     Recent Labs     01/28/20  1231 01/29/20  0300   RBC 5.11 3.90*   HEMOGLOBIN 10.3* 8.0*   HEMATOCRIT 33.9* 26.1*   PLATELETCT 447* 255       Imaging  CXR reviewed -> RIJ in place, ETT above lorrie, dense airspace disease  at the left base with blunted costophrenic angles, mild cephalization (L>R)  And OGT below diaphragm.     Assessment/Plan  Hyperosmolar coma due to secondary diabetes (HCC)  Assessment & Plan  -- Secondary to overwhelming septic shock   -- Cont insulin drip per [facility] protocol   -- Cont IVF  -- Check BMP and serum osm q6hr       Acute renal failure with tubular necrosis (HCC)  Assessment & Plan  -- Secondary to dehydration, HHS, and septic shock   -- Cont aggressive IVF resuscitation   -- Monitor UOP  -- Avoid nephrotoxin agents       Acute encephalopathy  Assessment & Plan  -- Secondary to metabolic due to severe HHS and acute renal failure   -- Cont insulin drip per [facility] protocol   -- Avoid BZD  -- Start fentanyl infusion to seek goal RASS 0 to -1   -- Early mobility when able       Lactic acidosis  Assessment & Plan  -- Secondary to severe dehydration from HHS and septic shock    -- Cont aggressive IVF resuscitation    -- Trend lactate q6hr   -- Cont abx        Acute respiratory failure with hypoxia (HCC)  Assessment & Plan  -- Secondary aspiration given reported intubation by Dr. Camara with copious ensure like secretions    -- Intubated 1/28-present   -- Meeting goals of oxygenation    -- ABG -> combined metabolic and respiratory acidosis   -- TV increased and will repeat ABG  -- On chlorhexidine   -- HOB elevation   -- Check respiratory culture   -- Cont vanc/cefepime, flagyl, and azithromycin      Septic shock (HCC)  Assessment & Plan  This is Septic shock Present on admission  SIRS criteria identified on my evaluation include: Fever, with temperature greater than 101 deg F, Tachycardia, with heart rate greater than 90 BPM and Leukocyosis, with WBC greater than 12,000  Source is pneumonia  Presentation includes: Severe sepsis present, persistent hypotension after 30 ml/kg completed, and initial lactate level result is >= 4 mmol/L.   Despite appropriate fluid resuscitation with crystalloid given per sepsis  guidelines, the patient remains hypotensive with systolic blood pressure less than 90 or MAP less than 65  Hemodynamic support with additional fluids and IV vasopressors as needed to maintain a SBP of 90 or MAP of 65  IV antibiotics as appropriate for source of sepsis  Reassessment: I have reassessed the patient's hemodynamic status      Hypothyroid- (present on admission)  Assessment & Plan  -- Check TSH    -- Resume synthroid  112 mcg daily       Thalassemia- (present on admission)  Assessment & Plan  -- Anemia with Hb 10.3  -- Monitor   -- Goal Hb >7           VTE:  Lovenox  Ulcer: H2 Antagonist  Lines: Central Line  Ongoing indication addressed and White Catheter  Ongoing indication addressed    I have performed a physical exam and reviewed and updated ROS and Plan today (1/29/2020). In review of yesterday's note (1/28/2020), there are no changes except as documented above.     Discussed patient condition and risk of morbidity and/or mortality with Family, RN, RT, Pharmacy, , Patient and Vilma Chow, Palliative care team      The patient remains critically ill on levophed requiring active titration.  Critical care time = 48 minutes in directly providing and coordinating critical care and extensive data review.  No time overlap and excludes procedures.

## 2020-01-29 NOTE — DISCHARGE PLANNING
ATTN: Case Management   RE: Referral for Hospice    [facility] is currently reviewing this referral. A nurse liaison will be in contact with the patient and family to assess for Hospice appropriateness. For immediate assistance, please call [phone] to speak to a member of our intake team.

## 2020-01-29 NOTE — PROGRESS NOTES
Lab called with critical result of lactic acid of 4.4 at 0121. Critical lab result read back to lab.   Dr. Black notified of critical lab result at 0122.  Critical lab result read back by Dr. Black.

## 2020-01-29 NOTE — CONSULTS
Critical Care H&P     Date of consult: 1/28/2020    Referring Physician  Fabi Camara MD     Reason for Consultation  Respiratory failure and shock     History of Presenting Illness  84 y.o. female who presented 1/28/2020 with acute hypoxia respiratory failure and shock. Patient with history of DM, hyperlipidemia, dementia, and prior history of left hip fracture who's wheelchair bound presents brought in by EMS from [facility] for shortness of breath evaluation. Per  Greg, patient has been at the [facility] for the past four years after her left hip surgery. Patient has frequent pneumonia requiring antibiotic from time to time. Over the past three weeks, patient has stopped eating and was started on ensure. No reported chest pain, fever/chills, SOB, dysuria, or N/V. Earlier this morning patient's  was called and notified about worsening shortness of breath which she was taken to the ED.      In the ED, labs notable for WBC 22.3, Hb 10.3, lactate 5.1, BUN 74, creatinine 19.95, and glucose 893. She was noted to be tachypnea and hypoxia. Code status from [facility] indicated patient was DNR. However, patient's  was not aware of the code status and requested her to be full code. Patient was intubated in the ED and noted copious yellow secretions. She was given ceftriaxone, unasyn, 2 liters bolus, started on levophed, and transferred to the ICU.     In the ICU, her levophed escalated to 30 mcg. She was given additional one liter bolus and started on vasopressin and hydrocortisone. Abx broaden to vanc/cefepime/azithromcyin. Discussed about goals of care with patient's  Greg and son Austin at bedside with the presence of JAQUELINE Joseph. Updated family about her clinical status due to septic shock with multiorgan failure. Greg and Austin expressed understanding and would like patient to be DNR but continue maximal medical therapy. Palliative care consulted and planned for family meeting tomorrow at noon.      Code Status  DNAR, I OK    Review of Systems  Review of Systems   Unable to perform ROS: Acuity of condition       Past Medical History   has a past medical history of Diabetes, Endometriosis, Hyperlipidemia (5/4/2011), Infectious disease, Pneumonia, Restless leg (5/4/2011), S/P appendectomy, S/P hysterectomy, Thalassemia, Tonsillar enlargement, and Unspecified disorder of thyroid.    Surgical History   has a past surgical history that includes other orthopedic surgery; other abdominal surgery; gyn surgery; other; trip by laparoscopy (2003); hysterectomy, total abdominal (1970); cataract extraction with iol; colonoscopy (4/2011); and egd with asp/bx (4/2011).    Family History  family history includes Cancer in her father and sister; Diabetes in her mother and sister; Heart Disease in her father, mother, and sister; Hypertension in her sister.    Social History   reports that she quit smoking about 19 years ago. She quit after 30.00 years of use. She has never used smokeless tobacco. She reports that she does not drink alcohol or use drugs.    Medications  Home Medications     Reviewed by Juan Manuel Mead (Pharmacy Tech) on 01/28/20 at 1344  Med List Status: Complete   Medication Last Dose Status   Cholecalciferol (VITAMIN D3) 1000 units Cap 1/27/2020 Active   cyanocobalamin (VITAMIN B-12) 500 MCG Tab 1/27/2020 Active   divalproex (DEPAKOTE SPRINKLES) 125 MG Capsule Delayed Release Sprinkle 1/27/2020 Active   HYDROcodone-acetaminophen (NORCO) 5-325 MG Tab per tablet 1/27/2020 Active   levothyroxine (SYNTHROID) 112 MCG Tab 1/28/2020 Active   magnesium hydroxide (MILK OF MAGNESIA) 400 MG/5ML Suspension 1/27/2020 Active              Current Facility-Administered Medications   Medication Dose Route Frequency Provider Last Rate Last Dose   • fentaNYL (SUBLIMAZE) injection 100 mcg  100 mcg Intravenous Once Fabi Camara M.D.   Stopped at 01/28/20 1330    And   • fentaNYL (SUBLIMAZE) injection 100 mcg  100 mcg  Intravenous Q HOUR PRN Fabi Camara M.D.   25 mcg at 01/28/20 1635   • midazolam (VERSED) injection 1-5 mg  1-5 mg Intravenous Q HOUR PRN Fabi Camara M.D.   2 mg at 01/28/20 1305   • lactated ringers infusion (BOLUS): BMI less than or equal to 30  30 mL/kg Intravenous Once PRN Fabi Camara M.D.       • lactated ringers infusion (BOLUS)  500 mL Intravenous Once PRN Fabi Camara M.D.       • norepinephrine (LEVOPHED) 8 mg in  mL Infusion  0-30 mcg/min Intravenous Continuous Robby Castro M.D. 9.4 mL/hr at 01/28/20 1332 5 mcg/min at 01/28/20 1332   • vasopressin (VASOSTRICT) 20 Units in  mL Infusion  0.03 Units/min Intravenous Continuous Robby Castro M.D. 9 mL/hr at 01/28/20 1500 0.03 Units/min at 01/28/20 1500   • MD Alert...Vancomycin per Pharmacy   Other PHARMACY TO DOSE Robby Castro M.D.       • hydrocortisone sodium succinate PF (SOLU-CORTEF) 100 MG injection 50 mg  50 mg Intravenous Q6HRS Robby Castro M.D.   50 mg at 01/28/20 1514   • azithromycin (ZITHROMAX) 500 mg in D5W 250 mL IVPB  500 mg Intravenous Q24HRS Robby Castro M.D. 250 mL/hr at 01/28/20 1527 500 mg at 01/28/20 1527   • insulin regular human (HUMULIN/NOVOLIN R) 62.5 Units in  mL infusion per protocol  0-29 Units/hr Intravenous Continuous Robby Castro M.D. 42 mL/hr at 01/28/20 1627 10.5 Units/hr at 01/28/20 1627   • DEXTROSE 10% BOLUS 125-250 mL  125-250 mL Intravenous PRN Robby Castro M.D.       • vancomycin (VANCOCIN) 1,250 mg in  mL IVPB  25 mg/kg Intravenous Once Robby Castro M.D.       • cefepime (MAXIPIME) 2 g in  mL IVPB  2 g Intravenous Q24HRS Robby Castro M.D. 200 mL/hr at 01/28/20 1514 2 g at 01/28/20 1514   • metroNIDAZOLE (FLAGYL) IVPB 500 mg  500 mg Intravenous Q8HRS Robby Castro M.D.       • acetaminophen (TYLENOL) suppository 650 mg  650 mg Rectal Q4HRS PRN Robby Castro M.D.       • lactated ringers infusion   Intravenous Continuous Robby Castro M.D.       • lactated ringers infusion  2,000 mL Intravenous Once  Robby Castro M.D.           Allergies  Allergies   Allergen Reactions   • Ciprofloxacin Hydrochloride Rash   • Clarithromycin Rash   • Fenofibrate    • Garlic Vomiting   • Keflex Rash   • Statins [Hmg-Coa-R Inhibitors]    • Sulfa Drugs Anaphylaxis       Vital Signs last 24 hours  Temp:  [36.2 °C (97.1 °F)] 36.2 °C (97.1 °F)  Pulse:  [0-202] 109  Resp:  [29-60] 29  BP: ()/(31-83) 115/60  SpO2:  [89 %-98 %] 97 %    Physical Exam  Physical Exam  Constitutional:       Comments: Intubated with minimum responsive    HENT:      Head: Normocephalic.      Mouth/Throat:      Mouth: Mucous membranes are dry.   Eyes:      Pupils: Pupils are equal, round, and reactive to light.   Neck:      Musculoskeletal: Neck supple.   Cardiovascular:      Rate and Rhythm: Regular rhythm. Tachycardia present.      Pulses: Normal pulses.   Pulmonary:      Comments: Coarse breath sounds bilaterally   Abdominal:      Palpations: Abdomen is soft.      Tenderness: There is no tenderness. There is no guarding.      Comments: Hypoactive bowel sounds      Skin:     Comments: Mottle extremities       Neurological:      Comments: Unable to assess         Fluids    Intake/Output Summary (Last 24 hours) at 1/28/2020 1655  Last data filed at 1/28/2020 1600  Gross per 24 hour   Intake --   Output 50 ml   Net -50 ml       Laboratory  Recent Results (from the past 48 hour(s))   CBC WITH DIFFERENTIAL    Collection Time: 01/28/20 12:31 PM   Result Value Ref Range    WBC 22.3 (H) 4.8 - 10.8 K/uL    RBC 5.11 4.20 - 5.40 M/uL    Hemoglobin 10.3 (L) 12.0 - 16.0 g/dL    Hematocrit 33.9 (L) 37.0 - 47.0 %    MCV 66.3 (L) 81.4 - 97.8 fL    MCH 20.2 (L) 27.0 - 33.0 pg    MCHC 30.4 (L) 33.6 - 35.0 g/dL    RDW 37.5 35.9 - 50.0 fL    Platelet Count 447 (H) 164 - 446 K/uL    MPV 13.0 (H) 9.0 - 12.9 fL    Neutrophils-Polys 83.60 (H) 44.00 - 72.00 %    Lymphocytes 8.40 (L) 22.00 - 41.00 %    Monocytes 6.50 0.00 - 13.40 %    Eosinophils 0.00 0.00 - 6.90 %    Basophils  0.80 0.00 - 1.80 %    Immature Granulocytes 0.70 0.00 - 0.90 %    Nucleated RBC 0.20 /100 WBC    Neutrophils (Absolute) 18.63 (H) 2.00 - 7.15 K/uL    Lymphs (Absolute) 1.86 1.00 - 4.80 K/uL    Monos (Absolute) 1.44 (H) 0.00 - 0.85 K/uL    Eos (Absolute) 0.01 0.00 - 0.51 K/uL    Baso (Absolute) 0.18 (H) 0.00 - 0.12 K/uL    Immature Granulocytes (abs) 0.15 (H) 0.00 - 0.11 K/uL    NRBC (Absolute) 0.05 K/uL   COMP METABOLIC PANEL    Collection Time: 20 12:31 PM   Result Value Ref Range    Sodium 145 135 - 145 mmol/L    Potassium 5.7 (H) 3.6 - 5.5 mmol/L    Chloride 106 96 - 112 mmol/L    Co2 20 20 - 33 mmol/L    Anion Gap 19.0 (H) 0.0 - 11.9    Glucose 893 (HH) 65 - 99 mg/dL    Bun 74 (HH) 8 - 22 mg/dL    Creatinine 1.95 (H) 0.50 - 1.40 mg/dL    Calcium 8.2 (L) 8.4 - 10.2 mg/dL    AST(SGOT) 45 12 - 45 U/L    ALT(SGPT) 37 2 - 50 U/L    Alkaline Phosphatase 78 30 - 99 U/L    Total Bilirubin 0.5 0.1 - 1.5 mg/dL    Albumin 3.2 3.2 - 4.9 g/dL    Total Protein 6.6 6.0 - 8.2 g/dL    Globulin 3.4 1.9 - 3.5 g/dL    A-G Ratio 0.9 g/dL   proBrain Natriuretic Peptide, NT    Collection Time: 20 12:31 PM   Result Value Ref Range    NT-proBNP 1199 (H) 0 - 125 pg/mL   TROPONIN    Collection Time: 20 12:31 PM   Result Value Ref Range    Troponin T 86 (H) 6 - 19 ng/L   LACTIC ACID    Collection Time: 20 12:31 PM   Result Value Ref Range    Lactic Acid 5.1 (HH) 0.5 - 2.0 mmol/L   ESTIMATED GFR    Collection Time: 20 12:31 PM   Result Value Ref Range    GFR If African American 30 (A) >60 mL/min/1.73 m 2    GFR If Non African American 25 (A) >60 mL/min/1.73 m 2   EKG    Collection Time: 20 12:33 PM   Result Value Ref Range    Report       [facility] Emergency Dept.    Test Date:  2020  Pt Name:    CLARITZA DIAZ             Department: EDSM  MRN:        [id number]                      Room:       [facility]  Gender:     Female                       Technician: [id number]  :         [id number]                   Requested By:FABI KNOX  Order #:    [id number]                    Reading MD: Fabi Powell    Measurements  Intervals                                Axis  Rate:       132                          P:          81  AK:         117                          QRS:        -19  QRSD:       66                           T:          11  QT:         328  QTc:        486    Interpretive Statements  Sinus tachycardia  Paired ventricular premature complexes  Aberrant conduction of SV complex(es)  Inferior infarct, age indeterminate  Lateral leads are also involved  Compared to ECG 05/02/2012 21:17:52  Ventricular premature complex(es) now present  Aberrant conduction of supraventricular beat(s) now pre sent  Sinus rhythm no longer present  ST (T wave) deviation no longer present  Electronically Signed On 1- 14:28:51 PST by Fabi Powell     URINALYSIS    Collection Time: 01/28/20  1:08 PM   Result Value Ref Range    Color Yellow     Character Cloudy (A)     Ph 5.0 5.0 - 8.0    Glucose 500 (A) Negative mg/dL    Ketones Trace (A) Negative mg/dL    Protein Negative Negative mg/dL    Bilirubin Negative Negative    Nitrite Negative Negative    Leukocyte Esterase Small (A) Negative    Occult Blood Moderate (A) Negative    Micro Urine Req Microscopic    REFRACTOMETER SG    Collection Time: 01/28/20  1:08 PM   Result Value Ref Range    Specific Gravity 1.023    URINE MICROSCOPIC (W/UA)    Collection Time: 01/28/20  1:08 PM   Result Value Ref Range    WBC Packed (A) /hpf    RBC 5-10 (A) /hpf    Bacteria Many (A) None /hpf   ARTERIAL BLOOD GAS w/ O2 (LAB)    Collection Time: 01/28/20  1:18 PM   Result Value Ref Range    Ph 7.24 (LL) 7.40 - 7.50    Pco2 41.9 (H) 26.0 - 37.0 mmHg    Po2 87.9 (H) 64.0 - 87.0 mmHg    O2 Saturation 93.3 93.0 - 99.0 %    Hco3 18 17 - 25 mmol/L    Base Excess -9 (L) -4 - 3 mmol/L    Body Temp see below Centigrade   Basic Metabolic Panel    Collection Time: 01/28/20   2:21 PM   Result Value Ref Range    Sodium 144 135 - 145 mmol/L    Potassium 5.8 (H) 3.6 - 5.5 mmol/L    Chloride 106 96 - 112 mmol/L    Co2 18 (L) 20 - 33 mmol/L    Glucose 906 (HH) 65 - 99 mg/dL    Bun 71 (HH) 8 - 22 mg/dL    Creatinine 2.24 (H) 0.50 - 1.40 mg/dL    Calcium 8.0 (L) 8.4 - 10.2 mg/dL    Anion Gap 20.0 (H) 0.0 - 11.9   ESTIMATED GFR    Collection Time: 01/28/20  2:21 PM   Result Value Ref Range    GFR If African American 25 (A) >60 mL/min/1.73 m 2    GFR If Non African American 21 (A) >60 mL/min/1.73 m 2   Blood Glucose    Collection Time: 01/28/20  3:58 PM   Result Value Ref Range    Glucose 878 (HH) 65 - 99 mg/dL       Imaging  CXR reviewed: RIJ in place. Patchy bilateral (L>R) basilar airspace disease.      Assessment/Plan  Hyperosmolar coma due to secondary diabetes (HCC)  Assessment & Plan  -- Secondary to overwhelming septic shock   -- Cont insulin drip per [facility] protocol   -- Cont IVF  -- Check BMP and serum osm q6hr       Acute renal failure with tubular necrosis (HCC)  Assessment & Plan  -- Secondary to dehydration, HHS, and septic shock   -- Cont aggressive IVF resuscitation   -- Monitor UOP  -- Avoid nephrotoxin agents       Acute encephalopathy  Assessment & Plan  -- Secondary to metabolic due to severe HHS and acute renal failure   -- Cont insulin drip per [facility] protocol   -- Avoid BZD  -- Start fentanyl infusion to seek goal RASS 0 to -1   -- Early mobility when able       Lactic acidosis  Assessment & Plan  -- Secondary to severe dehydration from HHS and septic shock    -- Cont aggressive IVF resuscitation    -- Trend lactate q6hr   -- Cont abx        Acute respiratory failure with hypoxia (HCC)  Assessment & Plan  -- Secondary aspiration given reported intubation by Dr. Camara with copious ensure like secretions    -- Intubated 1/28-present   -- Meeting goals of oxygenation    -- ABG -> combined metabolic and respiratory acidosis   -- TV increased and will repeat ABG  -- On chlorhexidine    -- HOB elevation   -- Check respiratory culture   -- Cont vanc/cefepime, flagyl, and azithromycin      Septic shock (HCC)  Assessment & Plan  This is Septic shock Present on admission  SIRS criteria identified on my evaluation include: Fever, with temperature greater than 101 deg F, Tachycardia, with heart rate greater than 90 BPM and Leukocyosis, with WBC greater than 12,000  Source is pneumonia  Presentation includes: Severe sepsis present, persistent hypotension after 30 ml/kg completed, and initial lactate level result is >= 4 mmol/L.   Despite appropriate fluid resuscitation with crystalloid given per sepsis guidelines, the patient remains hypotensive with systolic blood pressure less than 90 or MAP less than 65  Hemodynamic support with additional fluids and IV vasopressors as needed to maintain a SBP of 90 or MAP of 65  IV antibiotics as appropriate for source of sepsis  Reassessment: I have reassessed the patient's hemodynamic status      Hypothyroid- (present on admission)  Assessment & Plan  -- Check TSH    -- Resume synthroid  112 mcg daily       Thalassemia- (present on admission)  Assessment & Plan  -- Anemia with Hb 10.3  -- Monitor   -- Goal Hb >7        Discussed patient condition and risk of morbidity and/or mortality with Family, RN, RT, Pharmacy, Code status disscussed and Piper (Palliative Care)    .    The patient remains critically ill on two vasopressors requiring active titration and serial POCUS for volume assessment.  Critical care time = 85 minutes in directly providing and coordinating critical care and extensive data review.  No time overlap and excludes procedures.

## 2020-01-29 NOTE — WOUND TEAM
[facility]  Inpatient Services  Initial Wound and Skin Care Evaluation    Admission Date: 1/28/2020     Consult Date: 1/28 @ 1956   HPI, PMH, SH: Reviewed    Unit where seen by Wound Team: 3324/00     WOUND CONSULT RELATED TO:  Buttocks, R heel, L foot     Self Report / Pain Level:  No s/s of distress       OBJECTIVE:  On NAIF bed, intubated, Cortac present    WOUND TYPE, LOCATION, CHARACTERISTICS (Pressure Injuries: location, stage, POA or date identified)  Pressure Injury 01/28/20 Coccyx POA St 2 (Active)      1/28/2020  4:00 PM   Pressure Injury Stage 2    Site Assessment Edema;Red    Jessica-wound Assessment Dry;Intact    Margins Defined edges    Wound Length (cm) 1.5 cm 1/29/2020 12:00 PM   Wound Width (cm) 1.5 cm 1/29/2020 12:00 PM   Wound Depth (cm) 0.1 cm 1/29/2020 12:00 PM   Wound Surface Area (cm^2) 2.25 cm^2 1/29/2020 12:00 PM   Tunneling 0 cm 1/29/2020 12:00 PM   Undermining 0 cm 1/29/2020 12:00 PM   Drainage Amount None    Treatments Cleansed    Cleansing Normal Saline Irrigation    Periwound Protectant Barrier Paste    Dressing Options Open to Air    NEXT Weekly Photo (Inpatient Only) 02/05/20    Odor None     Exposed Structures None     Tissue Type and Percentage 100% red        Pressure Injury 01/28/20 Foot Left POA unstageable (Active)      1/28/2020  4:00 PM   Pressure Injury Stage U    State of Healing Eschar    Site Assessment Clean;Dry    Jessica-wound Assessment Purple;Brown    Margins Defined edges    Wound Length (cm) 1.2 cm 1/29/2020 12:00 PM   Wound Width (cm) 1 cm 1/29/2020 12:00 PM   Wound Depth (cm) 0 cm 1/29/2020 12:00 PM   Wound Surface Area (cm^2) 1.2 cm^2 1/29/2020 12:00 PM   Tunneling 0 cm 1/29/2020 12:00 PM   Undermining 0 cm 1/29/2020 12:00 PM   Drainage Amount None    Treatments Site care    Cleansing Not Applicable    Periwound Protectant Not Applicable    Dressing Options Open to Air    Dressing Cleansing/Solutions 3% Betadine    Dressing Change Frequency Daily    NEXT  Dressing Change  01/29/20    NEXT Weekly Photo (Inpatient Only) 02/05/20    Odor None     Pulses 1+;DP     Exposed Structures None     Tissue Type and Percentage 80% purple 20% brown        Pressure Injury 01/28/20 Heel right  POA DTI (Active)      1/28/2020  4:00 PM   Pressure Injury Stage DTPI    Site Assessment Clean;Dry;Red;Light purple    Jessica-wound Assessment Clean;Dry;Intact    Margins Defined edges    Wound Length (cm) 2.5 cm 1/29/2020 12:00 PM   Wound Width (cm) 1.7 cm 1/29/2020 12:00 PM   Wound Depth (cm) 0 cm 1/29/2020 12:00 PM   Wound Surface Area (cm^2) 4.25 cm^2 1/29/2020 12:00 PM   Tunneling 0 cm 1/29/2020 12:00 PM   Undermining 0 cm 1/29/2020 12:00 PM   Drainage Amount None    Periwound Protectant Not Applicable    Dressing Options Mepilex    Dressing Cleansing/Solutions Not Applicable    NEXT Dressing Change  01/29/20    NEXT Weekly Photo (Inpatient Only) 02/05/20    Odor None     Pulses 1+;DP     Exposed Structures None     Tissue Type and Percentage 100% purple/red        Vascular:  Wounds not r/t vascular issues    Lab Values:    Lab Results   Component Value Date/Time    WBC 21.0 (H) 01/29/2020 03:00 AM    WBC 6.3 03/06/2013 09:01 AM    RBC 3.90 (L) 01/29/2020 03:00 AM    RBC 5.68 (H) 03/06/2013 09:01 AM    HEMOGLOBIN 8.0 (L) 01/29/2020 03:00 AM    HEMATOCRIT 26.1 (L) 01/29/2020 03:00 AM             Lab Results   Component Value Date/Time    HBA1C 6.5 (H) 03/06/2013 09:01 AM    HBA1C 6.7 05/03/2012 07:29 AM        Culture: na      INTERVENTIONS BY WOUND TEAM:  Assessed R and L foot. Pt turned to R side, assessed buttocks and coccyx area. Cleaned small incontinence, applied barrier paste. Pad changed. Pt repositioned.     Interdisciplinary consultation: Patient, Bedside RN    EVALUATION: pt has POA DTI to medial R heel, unstageable L lat 5th NTH and St 2 to distal coccyx. She in incontinent and has redness around anus and in gluteal cleft.     Goals: Maintain intact skin until DTI reveals. Keep  unstageable pressure injury dry    NURSING PLAN OF CARE ORDERS (X):  Dressing changes: See Dressing Care orders: x  Skin care: See Skin Care orders: x  Rectal tube care: See Rectal Tube Care orders:        Other orders:                           RSKIN:   CURRENTLY IN PLACE (X), APPLIED THIS VISIT (A), ORDERED (O):   Q shift Samm:  X  Q shift pressure point assessments:  X  Pressure redistribution mattress                             Low Airloss     x                   Bariatric NAIF                     Bariatric foam                        Heel float boots                     Heel Silicone dressing  o                       Float Heels off Bed with Pillows               Barrier wipes         Barrier Cream         Barrier paste  x        Sacral silicone dressing         Silicone O2 tubing         Anchorfast  x       Cannula fixation Device (Tender )          Gray Foam Ear protectors           Trach with Optifoam split foam                 Waffle cushion        Waffle Overlay         Rectal tube or BMS    Purwick/Condom Cath          Antifungal tx      Interdry          Reposition q 2 hours  x      Up to chair        Ambulate      PT/OT        Dietician        Diabetes Education      PO     TF     TPN     NPO 1  # days   Other        WOUND TEAM PLAN OF CARE (X):   Dressing changes by wound team:          Follow up 1-2 times weekly:               Follow up 3 times weekly:                NPWT change 3 times weekly:     Follow up as needed: x      Other (explain):     Anticipated discharge plans (X):   LTACH:        SNF/Rehab:                  Home Care:           Outpatient Wound Center:            Self Care:            Other: family care conference to decide

## 2020-01-29 NOTE — PROGRESS NOTES
2 RN skin assessment done; skin not WDL. Redness/small breakdown noted to bottom. Purple-radha healing ulcer to L bottom of foot. Non-blancheable redness to R heel. See Wound flowsheet.

## 2020-01-29 NOTE — FLOWSHEET NOTE
20   Ventilator Management Group   Intensivist Group Yes   Ventilator Identifier   Ventilator Number [id number]   General Vent Information   #Vent Initial Day  Yes   Ventilator Red Plug Ventilator Plugged into Red Electrical Outlet   Vent Temp (Celsius) 35   Pulse Oximetry 97 %   Pulse 74   Resuscitation Bag Resuscitation Bag and Mask at Bedside and Checked   CO2 Monitoring   #ETCO2 16   Vent Alarms   Ventilation Alarms Reviewed / Activated Yes   Upper Pressure Limit (HI PIP) Alarm 40   Low VE (LPM) Alarm 4   High Respiratory Rate Alarm 40   Low Respiratory Rate Alarm 8   Low VT Alarm 150   Apnea Parameters Checked / Activated Yes   EtCO2 High Alarm 60   EtCO2 Low Alarm 15   Andres Vent   Andres Vent Yes   Andres Conventional Settings   Rate (breaths/min) 24   Vt Target (mL) 450   TI (Seconds) 0.9   PEEP/CPAP 8   Pramp 50   FiO2 60   Sensitivity Setting Flow Trigger   Other Settings 5   Andres Measured Parameters   P Peak (PIP) 34    Minute Volume 11   Control VTE (exp VT) 444   f Total (Breaths/Min) 24   I:E Ratio 1:1.9   P MEAN 8   PEEP/CPAP MONITORED 8   Static Compliance (ml / cm H2O) 30   R exp 26   Respiratory WDL   Respiratory (WDL) WDL   Chest Exam   Work Of Breathing / Effort Vented   Breath Sounds   Pre/Post Intervention Pre Intervention Assessment

## 2020-01-29 NOTE — CONSULTS
Reason for PC Consult: Advance Care Planning    Consulted by: Dr. Castro    Assessment:  General: 84 y.o. female admitted 1/28/2020 with acute hypoxia respiratory failure and septic shock.  Intubated on ventilator, Levophed gtt, sedation, AKF, and encephalopathy.  Patient with history of dementia, frequent PNA, not eating last 3 weeks ensure supplements only.    Social:  Patient is  (Bry) and one son (Keron).  Pt has been a long term resident of [facility] for 4 years.     Dyspnea: No-  Vent 60%  Last BM: 01/29/20-    Pain: Unable to determine-  Pt sedated per RN not following commands and agitated when sedation decreased  Depression: Unable to determine-    Dementia: Yes;       Spiritual:  Is Methodist or spirituality important for coping with this illness? Unable to determine- Per  pt is Sabianist   Has a  or spiritual provider visit been requested? Yes.   requested by pt's  for communion.    Palliative Performance Scale: 10%    Advance Directive: DPOA-  DPOA-HC faxed from [facility].  Document incomplete.  Notary page 6 missing. No witnesses.  Pt's  confirms that the pt completed the DPOA-HC.  Pt selected Bry Blue as agent and initialed Statements of Desire 2, 3, & 5.  Will request missing page from [facility].  DPOA: Yes- NOK spouse Bry  POLST: No-      Code Status: DNR- I OK    Outcome:  Lengthy discussion with pt's  Bry and son Keron.  Bry and Keron have a good understanding of pt's current clinical picture following discussion with Dr. Castro earlier today.  Pt with severe sepsis and multiorgan failure.  Discussed pt's condition before hospitalization and the slow progression  of pt's dementia including not wanting to eat recently and needing prompting from Bry.  Pt's advance directive reviewed in detail specifically statements of desire.  Son Keorn confirms the pt would not want to maintained on life support.   "Bry wanting a miracle but acknowledges that maybe this is \"her time\".  Discussed the option of changing the focus of care to the pt's comfort and extubating and stopping all treatment not related to treating pain or symptoms.  Keron offering support to his father that this is the right thing to do, that it is what his mother would want.  Bry and Keron both in agreement to extubate and transition pt to comfort care in the morning.  Bry tearful.  Kyle requesting a .  Discussed the option of hospice should the pt live long enough.   Bry hopeful that maybe she could go back to [facility] on hospice \"It's in her and Gods hands now\". Per  pt was on hospice previously.  Bry knowledgeable about hospice from that experience and also with other relatives.  Selected [facility].  Palliative Care contact information.  Family encouraged to all with any additional questions or needs. provided.  Family very complementary about Dr. Castro and the care the pt has received form [facility]. Empathetic listening and support provided throughout encounter.        Updated: Dianne LOTT, Dr. Castro and Opal     Plan: Extubate and transition the focus of the pt's care to comfort at 10:00am tomorrow (1/30/20).   requested to see pt today.  Hospice choice for [facility].      Thank you for allowing Palliative Care to participate in this patient's care. Please feel free to call [phone] with any questions or concerns.  "

## 2020-01-29 NOTE — PROGRESS NOTES
Report received from JAQUELINE Cobian. Attempt to wean down sedation, patient becomes agitated, pulls against restraints, pt unable to follow, continual coughing against ventilator, opens eyes, shaking body movements. Fentanyl titrated back up. All drips/lines verified, BL soft wrist restraints in place.   Dr. Castro at bedside. Orders received to transition patient off of insulin gtt and on to lantus and SSI. Will continue with care.

## 2020-01-29 NOTE — DIETARY
"Nutrition services: Day 0 of admit.  Cl Blue is a 84 y.o. female with admitting DX of severe sepsis. Hx of DM, hyperlipidemia and dementia.    Consult received for metabolic cart and presence of pressure ulcer.       Assessment:  Height: 157.5 cm (5' 2\")  Weight: 52.1 kg (114 lb 13.8 oz)  Body mass index is 21.01 kg/m²., BMI classification: low based on pt's age.  Diet/Intake: NPO    Evaluation:   1. Per H&P, pt stopped eating 3 weeks ago and started drinking Ensure supplements.   2. Skin: Per wound flowsheets, pt has a pressure injury on buttocks and right inner foot and a full thickness wound on left lateral foot. Wound care consulted. RD will follow for results of wound consult.   3. Pt currently is intubated with RD consult for an indirect metabolic cart study. Pt currently with order for pressors. Palliative consult scheduled for today at noon with family.  Pt is currently NPO. If family is agreeable to initiation of nutrition support, pt would benefit from this when medically feasible to encourage wound healing and improve nutrition status. RD will monitor for nutrition plan and order metabolic cart if appropriate.   4. Meds: Levophed at 4 mcg/min, Solu-Cortef, Lantus, SSI, magnesium sulfate IV, sodium phosphate IV  5. Labs: 1/29/20: glucose=193 (H but improved), phosphorus=0.8 (L), magnesium=1.8, potassium=4, sodium=145 (improved). Accuchecks:improving with range of 105-330    Malnutrition Risk: unknown at this time.     Recommendations/Plan:  1. Based on pt's family's wishes and medical feasibility, pt may benefit from initiation of nutrition support to encourage wound healing and improve nutrition status.   2. Monitor weight.  3. RD will follow.            "

## 2020-01-29 NOTE — PROGRESS NOTES
tech from Lab called with critical result of lactic acid of 10.8 at 1725. Critical lab result read back to tech.   Dr. Castro notified of critical lab result at 1725.  Critical lab result read back by Dr. Castro. Orders received to continue with 2 L LR fluid bolus and recheck lactic acid after bolus complete.

## 2020-01-29 NOTE — ASSESSMENT & PLAN NOTE
-- Secondary to metabolic due to metabolic, delirium, and septic shock    -- Restart depakote   -- Cont fentanyl infusion to seek goal RASS 0 to -1   -- Added precedex   -- Avoid BZD   -- PT/OT consultation today

## 2020-01-29 NOTE — PALLIATIVE CARE
Palliative Care follow-up  Discussed with BS RN and MD.  states he will be at BS at 12 noon on Wednesday. Meeting arranged for that time with PC and .       Updated: PC team; BS team aware    Plan: formal consult to follow    Thank you for allowing Palliative Care to support this patient and family. Contact [phone] for additional assistance, change in patient status, or with any questions/concerns.

## 2020-01-29 NOTE — PROGRESS NOTES
Sneha from Lab called with critical result of lactic acid of 6.5 at 2114. Critical lab result read back to Sneha.   Dr. Kirby notified of critical lab result at 2120.  Critical lab result read back by Dr. Kirby.

## 2020-01-29 NOTE — ASSESSMENT & PLAN NOTE
-- Secondary to severe dehydration from HHS and septic shock    -- Cont aggressive IVF resuscitation    -- Will give another 2 liters LR bolus and repeat lactate at 4 pm   -- Cont abx

## 2020-01-29 NOTE — ASSESSMENT & PLAN NOTE
This is Septic shock Present on admission  SIRS criteria identified on my evaluation include: Fever, with temperature greater than 101 deg F, Tachycardia, with heart rate greater than 90 BPM and Leukocyosis, with WBC greater than 12,000  Source is pneumonia  Presentation includes: Severe sepsis present, persistent hypotension after 30 ml/kg completed, and initial lactate level result is >= 4 mmol/L.   Despite appropriate fluid resuscitation with crystalloid given per sepsis guidelines, the patient remains hypotensive with systolic blood pressure less than 90 or MAP less than 65  Hemodynamic support with additional fluids and IV vasopressors as needed to maintain a SBP of 90 or MAP of 65  IV antibiotics as appropriate for source of sepsis  Reassessment: I have reassessed the patient's hemodynamic status

## 2020-01-29 NOTE — PROGRESS NOTES
Spiritual Care Note    Patient Information     Patient's Name: Cl Blue   MRN: [id number]    YOB: 1935   Age and Gender: 84 y.o. female   Service Area: ICU [facility]   Room (and Bed): 3324/00   Ethnicity or Nationality:     Primary Language: English   Bahai/Spiritual preference: Samaritan   Place of Residence: [place], [place]   Family/Friends/Others Present: , Son   Clinical Team Present: Palliative Director   Medical Diagnosis(-es)/Procedure(s): Severe sepsis   Code Status: DNAR, I OK    Date of Admission: 1/28/2020   Length of Stay: 0 days        Spiritual Care Provider Information:  Name of Spiritual Care Provider: Maddy Bose  Title of Spiritual Care Provider: Associate   Phone Number: [phone]  E-mail: [email or web address]    minutes    Spiritual Screen Results:    Gen Nursing        Palliative Care         Encounter/Request Information  Encounter/Request Type   Visited With: Health care provider(Palliative Director Ada)  Nature of the Visit: Initial, On shift  Continue Visiting: No  Crisis Visit: Patient actively dying/EOL  Referral From/ Origin of Request: Verbal staff(Request from Palliative Nurse for )  Referral To: Community clergy (REFERRED TO WAGNER WOLFF @[facility] TODAY @3428)    Religous Needs/Values  Ritual Needs Visit  Ritual Needs: EOL ritual, Anointing    Spiritual Assessment   Spiritual Care Encounters    Interacton/Conversation: This writer confirmed with worker at [facility] that  will be here before 5:00pm today to administer last rites to pt.    Notes:

## 2020-01-30 PROBLEM — E13.01 HYPEROSMOLAR COMA DUE TO SECONDARY DIABETES (HCC): Status: RESOLVED | Noted: 2020-01-01 | Resolved: 2020-01-01

## 2020-01-30 NOTE — PROGRESS NOTES
Updated patient's son and  at bedside. Family agreed to transition patient to comfort care. All questions answered. Orders placed. Hospice nurse present at bedside.      Robby Castro MD   Pulmonary Critical Care   [facility]

## 2020-01-30 NOTE — FLOWSHEET NOTE
20 1101   Vent Clock   Vent Discontinued Yes   Events/Summary/Plan   Events/Summary/Plan Pt was extubated.   General Vent Information   Pulse Oximetry 96 %   Pulse (!) 128   Vent Alarms   Low VE (LPM) Alarm 3   Andres Vent   Andres Vent Mode APVCMV   Andres Conventional Settings   Rate (breaths/min) 12   Vt Target (mL) 400   TI (Seconds) 0.91   PEEP/CPAP 8   Pramp 50   Andres Measured Parameters   P Peak (PIP) 19    Minute Volume 7.17   Control VTE (exp VT) 347   f Total (Breaths/Min) 18   P MEAN 10   Static Compliance (ml / cm H2O) 79   Servo Vent   PEEP (Monitored) (cmH2O) 8   Extubated   Patient Extubated, Vent Clock Stopped and Smart Text Completed? Yes

## 2020-01-30 NOTE — FLOWSHEET NOTE
20 1827   Events/Summary/Plan   Events/Summary/Plan Vent check   General Vent Information   #Ventilator Subsequent Day Yes   Ventilator Red Plug Ventilator Plugged into Red Electrical Outlet   Vent Temp (Celsius) 35   Pulse Oximetry 99 %   Pulse 78   Resuscitation Bag Resuscitation Bag and Mask at Bedside and Checked   CO2 Monitoring   #ETCO2 26   Vent Alarms   Ventilation Alarms Reviewed / Activated Yes   Upper Pressure Limit (HI PIP) Alarm 40   Low VE (LPM) Alarm 3   High Respiratory Rate Alarm 40   Low Respiratory Rate Alarm 8   Low VT Alarm 100   EtCO2 High Alarm 60   EtCO2 Low Alarm 15   Andres Vent   Andres Vent Yes   Andres Vent Mode APVCMV   Andres Conventional Settings   Rate (breaths/min) 12   Vt Target (mL) 400   TI (Seconds) 1.2   PEEP/CPAP 8   Pramp 50   FiO2 60   Sensitivity Setting Flow Trigger   Other Settings 5   Andres Measured Parameters   P Peak (PIP) 24    Minute Volume 7.2   Control VTE (exp VT) 381   f Total (Breaths/Min) 17   P MEAN 14   Static Compliance (ml / cm H2O) 20   R exp 19   Plateau Pressure (Q Shift) 24   Servo Vent   PEEP (Monitored) (cmH2O) 8   Respiratory WDL   Respiratory (WDL) X   Chest Exam   Work Of Breathing / Effort Vented   Breath Sounds   RUL Breath Sounds Clear   RML Breath Sounds Clear   RLL Breath Sounds Diminished   MARY Breath Sounds Clear   LLL Breath Sounds Diminished   Secretions   How Sputum Obtained Endotracheal;Suction   Sputum Amount Unable to Evaluate   Sputum Color Unable to Evaluate   Sputum Consistency Unable to Evaluate   Suction Frequency Suctioned Once or Twice Per Encounter

## 2020-01-30 NOTE — PROGRESS NOTES
Attempt to switch patient to precedex for sedation today with drop in HR to sinus 40s. Precedex stopped per Dr. Castro and fentanyl remains infusing, pt appears comfortable, opens eyes and coughs from time to time but is not making purposeful movements. Multiple visitors in to see patient today from [facility] facility.  and son at bedside for most of day.

## 2020-01-30 NOTE — FLOWSHEET NOTE
20 0309   Events/Summary/Plan   Events/Summary/Plan Vent check   General Vent Information   #Ventilator Subsequent Day Yes   Ventilator Red Plug Ventilator Plugged into Red Electrical Outlet   Vent Temp (Celsius) 35   Pulse Oximetry 99 %   Pulse 60   Resuscitation Bag Resuscitation Bag and Mask at Bedside and Checked   CO2 Monitoring   #ETCO2 26   Vent Alarms   Ventilation Alarms Reviewed / Activated Yes   Upper Pressure Limit (HI PIP) Alarm 40   Low VE (LPM) Alarm 3   High Respiratory Rate Alarm 40   Low Respiratory Rate Alarm 8   Low VT Alarm 100   EtCO2 High Alarm 60   EtCO2 Low Alarm 15   Andres Vent   Andres Vent Yes   Andres Vent Mode APVCMV   Andres Conventional Settings   Rate (breaths/min) 12   Vt Target (mL) 400   TI (Seconds) 1.2   PEEP/CPAP 8   Pramp 50   FiO2 400   Sensitivity Setting Flow Trigger   Other Settings 5   [place] Measured Parameters   P Peak (PIP) 22    Minute Volume 4.77   Control VTE (exp VT) 395   f Total (Breaths/Min) 12   P MEAN 11   Static Compliance (ml / cm H2O) 30   R exp 18   Plateau Pressure (Q Shift) 21   Servo Vent   PEEP (Monitored) (cmH2O) 8   Respiratory WDL   Respiratory (WDL) X   Chest Exam   Work Of Breathing / Effort Vented   Breath Sounds   RUL Breath Sounds Clear   RML Breath Sounds Clear   RLL Breath Sounds Diminished   MARY Breath Sounds Clear   LLL Breath Sounds Diminished   Secretions   How Sputum Obtained Endotracheal;Suction   Sputum Amount Unable to Evaluate   Sputum Color Unable to Evaluate   Sputum Consistency Unable to Evaluate   Suction Frequency Suctioned Once or Twice Per Encounter

## 2020-01-30 NOTE — PROGRESS NOTES
Critical Care Progress Note    Date of admission  1/28/2020    Chief Complaint  84 y.o. female admitted 1/28/2020 with acute hypoxia respiratory failure and septic shock.     Hospital Course    84 y.o. female who presented 1/28/2020 with acute hypoxia respiratory failure and shock. Patient with history of DM, hyperlipidemia, dementia, and prior history of left hip fracture who's wheelchair bound presents brought in by EMS from [facility] for shortness of breath evaluation. Per  Greg, patient has been at the [facility] for the past four years after her left hip surgery. Patient has frequent pneumonia requiring antibiotic from time to time. Over the past three weeks, patient has stopped eating and was started on ensure. No reported chest pain, fever/chills, SOB, dysuria, or N/V. Earlier this morning patient's  was called and notified about worsening shortness of breath which she was taken to the ED.       In the ED, labs notable for WBC 22.3, Hb 10.3, lactate 5.1, BUN 74, creatinine 19.95, and glucose 893. She was noted to be tachypnea and hypoxia. Code status from [facility] indicated patient was DNR. However, patient's  was not aware of the code status and requested her to be full code. Patient was intubated in the ED and noted copious yellow secretions. She was given ceftriaxone, unasyn, 2 liters bolus, started on levophed, and transferred to the ICU.      In the ICU, her levophed escalated to 30 mcg. She was given additional one liter bolus and started on vasopressin and hydrocortisone. Abx broaden to vanc/cefepime/azithromcyin. Discussed about goals of care with patient's  Greg and son Austin at bedside with the presence of JAQUELINE Joseph. Updated family about her clinical status due to septic shock with multiorgan failure. Greg and Austin expressed understanding and would like patient to be DNR but continue maximal medical therapy.     1/29: Lactate increased to 10.1 and additional fluids were  given. Levo down to 3 mcg and off vasopressin this morning. Additional 1 liter LR bolus given. Transitioned to lantus SC and insulin sliding scale     Interval Problem Update  Reviewed last 24 hour events:  Family met with the palliative care team and is planning for compassionate extubation today if no improvement   Hospice consulted   Levo 3-8 mcg   WBC 25.8    Resp cx positive for MRSA and abx switched to linezolid   ABG -> 7.41/34/84      Review of Systems  Review of Systems   Unable to perform ROS: Acuity of condition        Vital Signs for last 24 hours   Pulse:  [] 68  Resp:  [8-29] 11  BP: ()/(45-89) 90/51  SpO2:  [81 %-100 %] 97 %    Hemodynamic parameters for last 24 hours       Respiratory Information for the last 24 hours  Andres Vent Mode: APVCMV  Rate (breaths/min): 12  Vt Target (mL): 400  PEEP/CPAP: 8  FiO2: 40  P MEAN: 12  Control VTE (exp VT): 386    Physical Exam   Physical Exam  Constitutional:       Comments: Intubated and sedated. Agitated when off sedation.    HENT:      Head: Normocephalic.      Nose: Nose normal.      Mouth/Throat:      Mouth: Mucous membranes are moist.   Eyes:      Pupils: Pupils are equal, round, and reactive to light.   Neck:      Musculoskeletal: Normal range of motion.   Cardiovascular:      Rate and Rhythm: Normal rate and regular rhythm.      Pulses: Normal pulses.      Heart sounds: No murmur. No friction rub.   Pulmonary:      Comments: Coarse breath sounds bilaterally   Abdominal:      Comments: Distended, no guarding, hypoactive bowel sounds      Musculoskeletal:      Comments: Trace of pitting edema      Skin:     General: Skin is warm.   Neurological:      Comments: Unable to assess          Medications  Current Facility-Administered Medications   Medication Dose Route Frequency Provider Last Rate Last Dose   • levothyroxine (SYNTHROID) tablet 112 mcg  112 mcg Enteral Tube AM PETTY Castro M.D.   112 mcg at 01/30/20 0541   • famotidine (PEPCID)  injection 20 mg  20 mg Intravenous DAILY Robby Castro M.D.        Or   • famotidine (PEPCID) tablet 20 mg  20 mg Enteral Tube DAILY Robby Castro M.D.   20 mg at 01/30/20 0541   • insulin glargine (LANTUS) injection 20 Units  20 Units Subcutaneous QAM INSULIN Robby Castro M.D.   20 Units at 01/30/20 0542   • insulin regular (HUMULIN R) injection 2-9 Units  2-9 Units Subcutaneous Q6HRS Robby Castro M.D.   2 Units at 01/30/20 0600    And   • glucose 4 g chewable tablet 16 g  16 g Oral Q15 MIN PRN Robby Castro M.D.        And   • DEXTROSE 10% BOLUS 250 mL  250 mL Intravenous Q15 MIN PRN Robby Castro M.D.       • dexmedetomidine (PRECEDEX) 400 mcg in  mL infusion  0.1-1.5 mcg/kg/hr Intravenous Continuous Robby Castro M.D.   Stopped at 01/29/20 1421   • divalproex (DEPAKOTE SPRINKLE) capsule 125 mg  125 mg Enteral Tube TID Robby Castro M.D.   125 mg at 01/30/20 0541   • linezolid (ZYVOX) tablet 600 mg  600 mg Enteral Tube Q12HRS Robby Castro M.D.   600 mg at 01/30/20 0541   • fentaNYL (SUBLIMAZE) injection 100 mcg  100 mcg Intravenous Q HOUR PRN Fabi Camara M.D.   100 mcg at 01/30/20 0524   • lactated ringers infusion (BOLUS): BMI less than or equal to 30  30 mL/kg Intravenous Once PRN Fabi Camara M.D.       • lactated ringers infusion (BOLUS)  500 mL Intravenous Once PRN Fabi Camara M.D.       • norepinephrine (LEVOPHED) 8 mg in  mL Infusion  0-30 mcg/min Intravenous Continuous Robby Castro M.D. 3.8 mL/hr at 01/30/20 0727 2 mcg/min at 01/30/20 0727   • hydrocortisone sodium succinate PF (SOLU-CORTEF) 100 MG injection 50 mg  50 mg Intravenous Q6HRS Robby Castro M.D.   50 mg at 01/30/20 0540   • acetaminophen (TYLENOL) suppository 650 mg  650 mg Rectal Q4HRS PRN Robby Castro M.D.   650 mg at 01/28/20 2323   • fentaNYL (SUBLIMAZE) 50 mcg/mL in 50mL (Continuous Infusion)   Intravenous Continuous Robby Castro M.D. 3 mL/hr at 01/30/20 0345     • Respiratory Care per Protocol   Nebulization Continuous RT Robby Castro  M.D.       • enoxaparin (LOVENOX) inj 40 mg  40 mg Subcutaneous DAILY Robby Castro M.D.   40 mg at 01/30/20 0542   • acetaminophen (TYLENOL) tablet 650 mg  650 mg Oral Q6HRS PRN Robby Castro M.D.       • Respiratory Care per Protocol   Nebulization Continuous RT Robby Castro M.D.       • ipratropium-albuterol (DUONEB) nebulizer solution  3 mL Nebulization Q2HRS PRN (RT) Robby Castro M.D.       • senna-docusate (PERICOLACE or SENOKOT S) 8.6-50 MG per tablet 2 Tab  2 Tab Enteral Tube BID Robby Castro M.D.   Stopped at 01/28/20 1800    And   • polyethylene glycol/lytes (MIRALAX) PACKET 1 Packet  1 Packet Enteral Tube QDAY PRN Robby Castro M.D.   1 Packet at 01/30/20 0541    And   • magnesium hydroxide (MILK OF MAGNESIA) suspension 30 mL  30 mL Enteral Tube QDAY PRN Robby Castro M.D.        And   • bisacodyl (DULCOLAX) suppository 10 mg  10 mg Rectal QDAY PRN Robby Castro M.D.       • MD Alert...ICU Electrolyte Replacement per Pharmacy   Other PHARMACY TO DOSE Robby Castro M.D.       • lidocaine (XYLOCAINE) 1 % injection 1-2 mL  1-2 mL Tracheal Tube Q30 MIN PRN Robby Castro M.D.           Fluids    Intake/Output Summary (Last 24 hours) at 1/30/2020 0936  Last data filed at 1/30/2020 0800  Gross per 24 hour   Intake 4115.61 ml   Output 775 ml   Net 3340.61 ml       Laboratory  Recent Labs     01/28/20  1318 01/28/20  1751 01/29/20  0509 01/30/20  0908   VUFRX28J 7.24*  --   --   --    SEYELV550T 41.9*  --   --   --    AAKZY599O 87.9*  --   --   --    ZVSB6DTT 93.3  --   --   --    ARTHCO3 18  --   --   --    ARTBE -9*  --   --   --    ISTATAPH  --  7.274* 7.544* 7.416   ISTATAPCO2  --  30.4 24.8* 34.3   ISTATAPO2  --  73 87 84   ISTATATCO2  --  15* 22 23   ZYHEFAF9BVE  --  92* 98 97   ISTATARTHCO3  --  14.1* 21.4 22.1   ISTATARTBE  --  -12* -1 -2   ISTATTEMP  --  100.0 F 99.0 F 97.5 F   ISTATFIO2  --  60 60 40   ISTATSPEC  --  Arterial Arterial Arterial   ISTATAPHTC  --  7.263* 7.541* 7.425   WDNXBVZR3PO  --  76 88* 81          Recent Labs     01/29/20  0300 01/29/20  0500 01/29/20  0905 01/30/20  0400   SODIUM 150* * 145   POTASSIUM 4.0 RR 3.7 3.6   CHLORIDE 116* * 110   CO2 19* RR 19* 24   BUN 53* RR 48* 32*   CREATININE 1.30 RR 1.15 0.92   MAGNESIUM 1.8  --   --  2.2   PHOSPHORUS 0.8*  --   --  3.5   CALCIUM 8.2* RR 7.5* 7.7*     Recent Labs     01/28/20  1231  01/29/20  0300 01/29/20  0500 01/29/20  0905 01/30/20  0400   ALTSGPT 37  --  30  --   --   --    ASTSGOT 45  --  33  --   --   --    ALKPHOSPHAT 78  --  54  --   --   --    TBILIRUBIN 0.5  --  0.3  --   --   --    GLUCOSE 893*   < > 150* * 225*    < > = values in this interval not displayed.     Recent Labs     01/28/20  1231 01/29/20  0300 01/30/20  0400   WBC 22.3* 21.0* 25.8*   NEUTSPOLYS 83.60* 20.00* 57.00   LYMPHOCYTES 8.40* 15.00* 5.00*   MONOCYTES 6.50 8.00 4.00   EOSINOPHILS 0.00 0.00 0.00   BASOPHILS 0.80 1.00 0.00   ASTSGOT 45 33  --    ALTSGPT 37 30  --    ALKPHOSPHAT 78 54  --    TBILIRUBIN 0.5 0.3  --      Recent Labs     01/28/20  1231 01/29/20  0300 01/30/20  0400   RBC 5.11 3.90* 3.76*   HEMOGLOBIN 10.3* 8.0* 7.6*   HEMATOCRIT 33.9* 26.1* 24.7*   PLATELETCT 447* 255 238       Imaging  CXR reviewed today -> RIJ in place, ETT above lorrie, and persistent left basilar airspace disease and bilateral cephalization.     Assessment/Plan  Hypernatremia  Assessment & Plan  -- Secondary to dehydration  -- Cont IVF and daily BMP      Acute renal failure with tubular necrosis (HCC)  Assessment & Plan  --  Improving. Secondary to dehydration, HHS, and septic shock   -- Avoid nephrotoxin agents       Acute encephalopathy  Assessment & Plan  -- Secondary to metabolic due to metabolic, delirium, and septic shock    -- Restart depakote   -- Cont fentanyl infusion to seek goal RASS 0 to -1   -- Added precedex   -- Avoid BZD   -- PT/OT consultation today         Lactic acidosis  Assessment & Plan  -- Secondary to severe dehydration from HHS and septic shock     -- Cont aggressive IVF resuscitation    -- Will give another 2 liters LR bolus and repeat lactate at 4 pm   -- Cont abx        Acute respiratory failure with hypoxia (HCC)  Assessment & Plan  -- Secondary aspiration given reported intubation by Dr. Camara with copious ensure like secretions    -- Intubated 1/28-present   -- Meeting goals of oxygenation and ventilation   -- Cont linezolid for MRSA pneumonia   -- On chlorhexidine   -- HOB elevation       Septic shock (HCC)  Assessment & Plan  This is Septic shock Present on admission  SIRS criteria identified on my evaluation include: Fever, with temperature greater than 101 deg F, Tachycardia, with heart rate greater than 90 BPM and Leukocyosis, with WBC greater than 12,000  Source is pneumonia  Presentation includes: Severe sepsis present, persistent hypotension after 30 ml/kg completed, and initial lactate level result is >= 4 mmol/L.   Despite appropriate fluid resuscitation with crystalloid given per sepsis guidelines, the patient remains hypotensive with systolic blood pressure less than 90 or MAP less than 65  Hemodynamic support with additional fluids and IV vasopressors as needed to maintain a SBP of 90 or MAP of 65  IV antibiotics as appropriate for source of sepsis  Reassessment: I have reassessed the patient's hemodynamic status      Hypothyroid- (present on admission)  Assessment & Plan  -- TSH 0.170   -- Cont synthroid  112 mcg daily       Thalassemia- (present on admission)  Assessment & Plan  -- Anemia with Hb 7.6 after fluid resuscitation   -- Monitor   -- Goal Hb >7         VTE:  Lovenox  Ulcer: H2 Antagonist  Lines: Central Line  Ongoing indication addressed and White Catheter  Ongoing indication addressed    I have performed a physical exam and reviewed and updated ROS and Plan today (1/30/2020). In review of yesterday's note (1/29/2020), there are no changes except as documented above.     Discussed patient condition and risk of morbidity and/or  mortality with Family, RN, RT, Pharmacy, , Patient and Vilma Chow, Palliative care team      The patient remains critically ill on levophed requiring active titration.  Critical care time = 32 minutes in directly providing and coordinating critical care and extensive data review.  No time overlap and excludes procedures.

## 2020-01-30 NOTE — PROGRESS NOTES
Pt's HR dropping to wide complex rhythm 30 BPM. Family remains at bedside, palliative care RN at bedside with family.

## 2020-01-30 NOTE — HOSPICE
ATTN: Provider/Care management team/Nurses    RE: Referral to [facility]    Thank you for the referral to [facility] for the patient listed above. We have made contact with the patient. To access [facility] documentation, click on Chart Review and then click onto Encounters (left top corner of screen).      If you have any questions or concerns regarding the patient’s transition to [facility], please do not hesitate to contact us at [phone].     We look forward to collaborating with you,  [facility] Care Team

## 2020-01-30 NOTE — FLOWSHEET NOTE
20 2228   Events/Summary/Plan   Events/Summary/Plan Vent check   General Vent Information   #Ventilator Subsequent Day Yes   Ventilator Red Plug Ventilator Plugged into Red Electrical Outlet   Vent Temp (Celsius) 35   Pulse Oximetry 98 %   Pulse 83   Resuscitation Bag Resuscitation Bag and Mask at Bedside and Checked   CO2 Monitoring   #ETCO2 25   Vent Alarms   Ventilation Alarms Reviewed / Activated Yes   Upper Pressure Limit (HI PIP) Alarm 40   Low VE (LPM) Alarm 3   High Respiratory Rate Alarm 40   Low Respiratory Rate Alarm 8   Low VT Alarm 100   EtCO2 High Alarm 60   EtCO2 Low Alarm 15   Andres Vent   Andres Vent Yes   Andres Vent Mode APVCMV   Andres Conventional Settings   Rate (breaths/min) 12   Vt Target (mL) 400   TI (Seconds) 1.2   PEEP/CPAP 8   Pramp 50   FiO2 60   Sensitivity Setting Flow Trigger   Other Settings 5   Andres Measured Parameters   P Peak (PIP) 15    Minute Volume 4.96   Control VTE (exp VT) 31   f Total (Breaths/Min) 18   P MEAN 13   Static Compliance (ml / cm H2O) 117   R exp 18   Plateau Pressure (Q Shift) 24   Servo Vent   PEEP (Monitored) (cmH2O) 8   Respiratory WDL   Respiratory (WDL) X   Chest Exam   Work Of Breathing / Effort Vented   Breath Sounds   RUL Breath Sounds Clear   RML Breath Sounds Clear   RLL Breath Sounds Diminished   MARY Breath Sounds Clear   LLL Breath Sounds Diminished

## 2020-01-30 NOTE — THERAPY
Pt has transitioned to comfort care. Pt is in no acute skilled therapy needs at his time, will cancel therapy orders at this time. Please re-order if plan of care is to change.

## 2020-01-30 NOTE — RESPIRATORY CARE
Adult Ventilation Update    Total Vent Days: 2    Patient Lines/Drains/Airways Status    Active Airway     Name: Placement date: Placement time: Site: Days:    Airway ETT Oral 7.0  01/28/20   1316   Oral  2              Events/Summary/Plan:pt on full support. No changes to pt or vent. Possibly comfort care tomorrow AM.

## 2020-01-30 NOTE — RESPIRATORY CARE
COPD EDUCATION by COPD CLINICAL EDUCATOR  1/30/2020 at 7:10 AM by Janki Pedersen RRT     Patient reviewed by COPD education team. Patient does not qualify for the COPD program. Patient does not have a history or diagnosis of COPD and is a non-smoker, therefore does not qualify for the COPD program.

## 2020-01-30 NOTE — FLOWSHEET NOTE
01/29/20 2231   Airway ETT Oral 7.0   Placement Date/Time: 01/28/20 1316   Airway Placement: Central  Airway Type: ETT  Airway Location: Oral  Airway Size: 7.0  Inserted In: ER  Inserted by: MD   Site Assessment Intact;Dry   Airway Tube Secured Commercial securing device   Secured At  (cm) 21   Tube Repositioned Right;Bite Block In    Cuff Pressure cmH2O (R.C.) 26

## 2020-01-30 NOTE — FLOWSHEET NOTE
01/29/20 1830   Airway ETT Oral 7.0   Placement Date/Time: 01/28/20 1316   Airway Placement: Central  Airway Type: ETT  Airway Location: Oral  Airway Size: 7.0  Inserted In: ER  Inserted by: MD   Site Assessment Intact;Dry   Airway Tube Secured Commercial securing device   Secured At  (cm) 21   Tube Repositioned Left;Bite Block In    Cuff Pressure cmH2O (R.C.) 24

## 2020-01-30 NOTE — FLOWSHEET NOTE
01/30/20 0312   Airway ETT Oral 7.0   Placement Date/Time: 01/28/20 1316   Airway Placement: Central  Airway Type: ETT  Airway Location: Oral  Airway Size: 7.0  Inserted In: ER  Inserted by: MD   Site Assessment Intact;Dry   Airway Tube Secured Commercial securing device   Secured At  (cm) 21   Tube Repositioned Bite Block In ;Center

## 2020-01-30 NOTE — CARE PLAN
Problem: Nutritional:  Goal: Achieve adequate nutritional intake  Description  If agreeable to family, initiate nutrition support when medically feasible.    Outcome: DISCHARGED-GOAL NOT MET    Pt is now receiving comfort care measures with plan to D/C to SNF with hospice care.

## 2020-01-30 NOTE — PROGRESS NOTES
Assumed care of patient. Report received from Opal PARSONS. Pt calm, resting in bed. VSS. Chart reviewed. Pt needs met at this time.

## 2020-01-30 NOTE — PROGRESS NOTES
Son, daughter in law and  at bedside; hospice RN and Dr. Castro in to speak with family. Family requests patient be medicated with anti-anxiety medication prior to ETT removal. Pt medicated per MAR with increase in agitation. Orders received for Haldol, discussed with patients  and son. Pt medicated per MAR.

## 2020-01-30 NOTE — PROGRESS NOTES
Vilma from Lab called with critical result of MRSA + sputum at 1045. Critical lab result read back to Vilma.   Dr. Castro aware of critical lab result.

## 2020-01-30 NOTE — CARE PLAN
Problem: Pain Management  Goal: Pain level will decrease to patient's comfort goal  Outcome: PROGRESSING AS EXPECTED  Note:   Adequate pain management on fentanyl drip per CPOT assessment. Pt resting when not stimulated.      Problem: Communication  Goal: The ability to communicate needs accurately and effectively will improve  Outcome: PROGRESSING SLOWER THAN EXPECTED  Note:   Patient sedated on fentanyl. Agitated, confused, and unable to follow commands when fentanyl dose decreased by half. Intubated and unable to communicate needs.

## 2020-01-31 NOTE — PALLIATIVE CARE
Palliative Care follow-up  PC RN visited pt, , children and friend at bedside. Pt is not responsive, pale, agonal breathing and slow heart beat with wide complex rhythm. Spouse tearful, calling out to patient to keep breathing. Pt  while PC RN in room.     Offered emotional support, listened as spouse expressed grief and sorrow of his wife's death. Answered questions regarding next steps, encouraged family to stay as long as they need to with pt. Family verbalized appreciation for all the support they have received so far.     Active listening, education, validation, normalization, therapeutic touch, and emotional support provided.     Updated:   PC Team, bedside RN    Plan:   Continue to support family as needed.         Thank you for allowing Palliative Care to participate in this patient's care. Please feel free to call [phone] with any questions or concerns.

## 2020-01-31 NOTE — DISCHARGE SUMMARY
Death Summary    Cause of Death  Acute hypoxia respiratory failure due to septic shock due to MRSA pneumonia due to aspiration due to end stage dementia.     Comorbid Conditions at the Time of Death  Active Problems:    Thalassemia POA: Yes    Hypothyroid POA: Yes    Septic shock (HCC) POA: Unknown    Acute respiratory failure with hypoxia (HCC) POA: Unknown    Lactic acidosis POA: Unknown    Acute encephalopathy POA: Unknown    Acute renal failure with tubular necrosis (HCC) POA: Unknown    Hypernatremia POA: Unknown  Resolved Problems:    Hyperosmolar coma due to secondary diabetes (HCC) POA: Unknown      History of Presenting Illness and Hospital Course  84 y.o. female who presented 1/28/2020 with acute hypoxia respiratory failure and shock. Patient with history of DM, hyperlipidemia, dementia, and prior history of left hip fracture who's wheelchair bound presents brought in by EMS from [facility] for shortness of breath evaluation. Per  Greg, patient has been at the [facility] for the past four years after her left hip surgery. Patient has frequent pneumonia requiring antibiotic from time to time. Over the past three weeks, patient has stopped eating and was started on ensure. No reported chest pain, fever/chills, SOB, dysuria, or N/V. Earlier this morning patient's  was called and notified about worsening shortness of breath which she was taken to the ED.       In the ED, labs notable for WBC 22.3, Hb 10.3, lactate 5.1, BUN 74, creatinine 19.95, and glucose 893. She was noted to be tachypnea and hypoxia. Code status from [facility] indicated patient was DNR. However, patient's  was not aware of the code status and requested her to be full code. Patient was intubated in the ED and noted copious yellow secretions. She was given ceftriaxone, unasyn, 2 liters bolus, started on levophed, and transferred to the ICU.      In the ICU, her levophed escalated to 30 mcg. She was given additional one liter  bolus and started on vasopressin and hydrocortisone. Abx broaden to vanc/cefepime/azithromcyin. Discuss about goals of care and family agreed to DNR but continue maximal medical support. She was given additional fluid bolus and lactate improved. HHS resolved and transitioned SC insulin. Cx data came back positive for MRSA pneumonia and abx switched to linezolid. Patient remains encephalopathic and unable to wean off from levophed. Palliative care consulted for further goals of care. Family opted to transition to full comfort per patient's wishes. Hospice consulted. Patient was compassionately extubated on 1/30 and transitioned to comfort care. She passed away on 1/30/2020 at 1553. RN pronounced and confirmed time of death.    Robby Castro MD   Pulmonary Critical Care   [facility]

## 2020-02-02 LAB
BACTERIA BLD CULT: NORMAL
BACTERIA BLD CULT: NORMAL
SIGNIFICANT IND 70042: NORMAL
SIGNIFICANT IND 70042: NORMAL
SITE SITE: NORMAL
SITE SITE: NORMAL
SOURCE SOURCE: NORMAL
SOURCE SOURCE: NORMAL
